# Patient Record
Sex: FEMALE | Race: WHITE | NOT HISPANIC OR LATINO | Employment: FULL TIME | URBAN - METROPOLITAN AREA
[De-identification: names, ages, dates, MRNs, and addresses within clinical notes are randomized per-mention and may not be internally consistent; named-entity substitution may affect disease eponyms.]

---

## 2017-03-20 ENCOUNTER — ALLSCRIPTS OFFICE VISIT (OUTPATIENT)
Dept: OTHER | Facility: OTHER | Age: 57
End: 2017-03-20

## 2018-01-15 VITALS
HEIGHT: 64 IN | HEART RATE: 68 BPM | BODY MASS INDEX: 50.02 KG/M2 | RESPIRATION RATE: 18 BRPM | SYSTOLIC BLOOD PRESSURE: 170 MMHG | DIASTOLIC BLOOD PRESSURE: 71 MMHG | WEIGHT: 293 LBS

## 2019-04-11 ENCOUNTER — TRANSCRIBE ORDERS (OUTPATIENT)
Dept: ADMINISTRATIVE | Facility: HOSPITAL | Age: 59
End: 2019-04-11

## 2019-04-11 DIAGNOSIS — Z12.39 BREAST SCREENING, UNSPECIFIED: Primary | ICD-10-CM

## 2019-06-03 ENCOUNTER — HOSPITAL ENCOUNTER (OUTPATIENT)
Dept: MAMMOGRAPHY | Facility: HOSPITAL | Age: 59
Discharge: HOME | End: 2019-06-03
Attending: SURGERY
Payer: COMMERCIAL

## 2019-06-03 VITALS — HEIGHT: 64 IN | WEIGHT: 293 LBS | BODY MASS INDEX: 50.02 KG/M2

## 2019-06-03 DIAGNOSIS — Z12.39 BREAST SCREENING, UNSPECIFIED: ICD-10-CM

## 2019-06-03 PROCEDURE — 77067 SCR MAMMO BI INCL CAD: CPT

## 2019-06-03 PROCEDURE — 77063 BREAST TOMOSYNTHESIS BI: CPT

## 2019-06-18 ENCOUNTER — HOSPITAL ENCOUNTER (OUTPATIENT)
Dept: MAMMOGRAPHY | Facility: HOSPITAL | Age: 59
Discharge: HOME | End: 2019-06-18
Attending: SURGERY
Payer: COMMERCIAL

## 2019-06-18 DIAGNOSIS — R92.0 MAMMOGRAPHIC MICROCALCIFICATION: ICD-10-CM

## 2019-06-18 PROCEDURE — 77065 DX MAMMO INCL CAD UNI: CPT

## 2019-06-24 ENCOUNTER — OFFICE VISIT (OUTPATIENT)
Dept: SURGERY | Facility: CLINIC | Age: 59
End: 2019-06-24
Payer: COMMERCIAL

## 2019-06-24 VITALS
HEIGHT: 64 IN | RESPIRATION RATE: 16 BRPM | DIASTOLIC BLOOD PRESSURE: 72 MMHG | BODY MASS INDEX: 50.02 KG/M2 | TEMPERATURE: 98.6 F | HEART RATE: 88 BPM | WEIGHT: 293 LBS | SYSTOLIC BLOOD PRESSURE: 140 MMHG

## 2019-06-24 DIAGNOSIS — Z12.39 SCREENING BREAST EXAMINATION: Primary | ICD-10-CM

## 2019-06-24 PROBLEM — N60.92 ATYPICAL DUCTAL HYPERPLASIA OF LEFT BREAST: Status: ACTIVE | Noted: 2019-06-24

## 2019-06-24 PROBLEM — N60.92 ATYPICAL DUCTAL HYPERPLASIA OF LEFT BREAST: Status: RESOLVED | Noted: 2019-06-24 | Resolved: 2019-06-24

## 2019-06-24 PROCEDURE — 99213 OFFICE O/P EST LOW 20 MIN: CPT | Performed by: PHYSICIAN ASSISTANT

## 2019-06-24 RX ORDER — NICOTINE POLACRILEX 4 MG/1
20 GUM, CHEWING ORAL DAILY
COMMUNITY

## 2019-06-24 RX ORDER — LISINOPRIL 10 MG/1
10 TABLET ORAL 2 TIMES DAILY
COMMUNITY
Start: 2018-04-06

## 2019-06-24 RX ORDER — MOMETASONE FUROATE 50 UG/1
2 SPRAY, METERED NASAL AS NEEDED
COMMUNITY
End: 2020-11-16 | Stop reason: SDUPTHER

## 2019-06-24 RX ORDER — ALPRAZOLAM 0.25 MG/1
0.25 TABLET ORAL AS NEEDED
COMMUNITY
Start: 2018-04-07

## 2020-06-08 ENCOUNTER — HOSPITAL ENCOUNTER (OUTPATIENT)
Dept: NON INVASIVE DIAGNOSTICS | Facility: HOSPITAL | Age: 60
Discharge: HOME | End: 2020-06-08
Payer: COMMERCIAL

## 2020-06-08 ENCOUNTER — TRANSCRIBE ORDERS (OUTPATIENT)
Dept: ADMINISTRATIVE | Facility: HOSPITAL | Age: 60
End: 2020-06-08

## 2020-06-08 DIAGNOSIS — M79.662 PAIN OF LEFT CALF: ICD-10-CM

## 2020-06-08 DIAGNOSIS — L53.9 ERYTHEMATOUS CONDITION: ICD-10-CM

## 2020-06-08 DIAGNOSIS — M79.662 PAIN OF LEFT CALF: Primary | ICD-10-CM

## 2020-06-08 PROCEDURE — 93971 EXTREMITY STUDY: CPT

## 2020-06-08 PROCEDURE — 93971 EXTREMITY STUDY: CPT | Performed by: SURGERY

## 2020-06-19 ENCOUNTER — HOSPITAL ENCOUNTER (OUTPATIENT)
Dept: MAMMOGRAPHY | Facility: HOSPITAL | Age: 60
Discharge: HOME | End: 2020-06-19
Payer: COMMERCIAL

## 2020-06-19 VITALS — HEIGHT: 64 IN | WEIGHT: 293 LBS | BODY MASS INDEX: 50.02 KG/M2

## 2020-06-19 DIAGNOSIS — Z12.39 SCREENING BREAST EXAMINATION: ICD-10-CM

## 2020-06-19 PROCEDURE — 77063 BREAST TOMOSYNTHESIS BI: CPT

## 2020-06-19 PROCEDURE — 77067 SCR MAMMO BI INCL CAD: CPT

## 2020-06-25 RX ORDER — HYDROCHLOROTHIAZIDE 25 MG/1
25 TABLET ORAL AS NEEDED
COMMUNITY
Start: 2020-06-08

## 2020-06-29 ENCOUNTER — TELEPHONE (OUTPATIENT)
Dept: SURGERY | Facility: CLINIC | Age: 60
End: 2020-06-29

## 2020-06-29 ENCOUNTER — OFFICE VISIT (OUTPATIENT)
Dept: SURGERY | Facility: CLINIC | Age: 60
End: 2020-06-29
Payer: COMMERCIAL

## 2020-06-29 VITALS
BODY MASS INDEX: 50.02 KG/M2 | HEART RATE: 99 BPM | WEIGHT: 293 LBS | SYSTOLIC BLOOD PRESSURE: 140 MMHG | HEIGHT: 64 IN | DIASTOLIC BLOOD PRESSURE: 72 MMHG | TEMPERATURE: 97.6 F

## 2020-06-29 DIAGNOSIS — Z12.39 SCREENING BREAST EXAMINATION: ICD-10-CM

## 2020-06-29 DIAGNOSIS — N60.92 ATYPICAL DUCTAL HYPERPLASIA OF LEFT BREAST: Primary | ICD-10-CM

## 2020-06-29 PROCEDURE — 99213 OFFICE O/P EST LOW 20 MIN: CPT | Performed by: PHYSICIAN ASSISTANT

## 2021-03-04 ENCOUNTER — TRANSCRIBE ORDERS (OUTPATIENT)
Dept: ADMINISTRATIVE | Facility: HOSPITAL | Age: 61
End: 2021-03-04

## 2021-03-04 DIAGNOSIS — H53.8 BLURRY VISION: ICD-10-CM

## 2021-03-04 DIAGNOSIS — I10 HYPERTENSION, UNSPECIFIED TYPE: ICD-10-CM

## 2021-03-04 DIAGNOSIS — R51.9 NONINTRACTABLE HEADACHE, UNSPECIFIED CHRONICITY PATTERN, UNSPECIFIED HEADACHE TYPE: Primary | ICD-10-CM

## 2021-03-11 ENCOUNTER — HOSPITAL ENCOUNTER (OUTPATIENT)
Dept: MRI IMAGING | Facility: HOSPITAL | Age: 61
Discharge: HOME | End: 2021-03-11
Payer: COMMERCIAL

## 2021-03-11 DIAGNOSIS — R51.9 NONINTRACTABLE HEADACHE, UNSPECIFIED CHRONICITY PATTERN, UNSPECIFIED HEADACHE TYPE: ICD-10-CM

## 2021-03-11 DIAGNOSIS — H53.8 BLURRY VISION: ICD-10-CM

## 2021-03-11 DIAGNOSIS — I10 HYPERTENSION, UNSPECIFIED TYPE: ICD-10-CM

## 2021-03-11 PROCEDURE — G1004 CDSM NDSC: HCPCS

## 2021-03-11 PROCEDURE — 70553 MRI BRAIN STEM W/O & W/DYE: CPT

## 2021-03-11 PROCEDURE — A9585 GADOBUTROL INJECTION: HCPCS | Performed by: RADIOLOGY

## 2021-03-11 RX ADMIN — GADOBUTROL 9 ML: 604.72 INJECTION INTRAVENOUS at 13:17

## 2021-06-21 ENCOUNTER — HOSPITAL ENCOUNTER (OUTPATIENT)
Dept: MAMMOGRAPHY | Facility: HOSPITAL | Age: 61
Discharge: HOME | End: 2021-06-21
Payer: COMMERCIAL

## 2021-06-21 VITALS — BODY MASS INDEX: 50.02 KG/M2 | WEIGHT: 293 LBS | HEIGHT: 64 IN

## 2021-06-21 DIAGNOSIS — N60.92 ATYPICAL DUCTAL HYPERPLASIA OF LEFT BREAST: ICD-10-CM

## 2021-06-21 DIAGNOSIS — Z12.39 SCREENING BREAST EXAMINATION: ICD-10-CM

## 2021-06-21 PROCEDURE — 77067 SCR MAMMO BI INCL CAD: CPT

## 2021-06-21 PROCEDURE — 77063 BREAST TOMOSYNTHESIS BI: CPT

## 2021-06-28 ENCOUNTER — OFFICE VISIT (OUTPATIENT)
Dept: SURGERY | Facility: CLINIC | Age: 61
End: 2021-06-28
Payer: COMMERCIAL

## 2021-06-28 VITALS
BODY MASS INDEX: 50.02 KG/M2 | SYSTOLIC BLOOD PRESSURE: 140 MMHG | HEIGHT: 64 IN | WEIGHT: 293 LBS | DIASTOLIC BLOOD PRESSURE: 88 MMHG | HEART RATE: 97 BPM | RESPIRATION RATE: 18 BRPM | TEMPERATURE: 98.2 F

## 2021-06-28 DIAGNOSIS — N60.92 ATYPICAL DUCTAL HYPERPLASIA OF LEFT BREAST: Primary | ICD-10-CM

## 2021-06-28 PROBLEM — G47.33 OSA (OBSTRUCTIVE SLEEP APNEA): Status: ACTIVE | Noted: 2017-10-17

## 2021-06-28 PROCEDURE — 99213 OFFICE O/P EST LOW 20 MIN: CPT | Performed by: SURGERY

## 2021-06-28 NOTE — ASSESSMENT & PLAN NOTE
The patient returns for her routine yearly breast examination status post breast conserving therapy for the treatment of her left breast atypical ductal hyperplasia by excisional biopsy in June 2019  Today the patient denies all complaints referable to her breasts  On physical examination the patient is a pleasant well-nourished well-developed female  She is in no acute distress  Competent reliable as a historian  The patient has no palpable cervical, supraclavicular, or axillary lymphadenopathy bilaterally  The breasts are symmetric  There are no dominant lumps, masses, skin changes or nipple retraction bilaterally  The patient appears clinically stable with regards to her history of left breast atypical ductal hyperplasia  I have advised yearly physical examination and mammography after 21st June 2022  The patient will follow up with Dr Chuy Aranda her gynecologist for her routine breast health maintenance examinations

## 2021-06-28 NOTE — LETTER
June 28, 2021     Kwabena Moore, [address] [facility] [address]    Patient: Wilmer Flower   YOB: 1960   Date of Visit: 6/28/2021       Dear Dr Gricelda Soriano:    Thank you for referring Shabnam Armenta to me for evaluation  Below are my notes for this consultation  If you have questions, please do not hesitate to call me  I look forward to following your patient along with you  Sincerely,        Obdulia Morales MD        CC: No Recipients  Obdulia Morales MD  6/28/2021  8:58 AM  Incomplete  Assessment/Plan:    Atypical ductal hyperplasia of left breast    The patient returns for her routine yearly breast examination status post breast conserving therapy for the treatment of her left breast atypical ductal hyperplasia by excisional biopsy in June 2019  Today the patient denies all complaints referable to her breasts  On physical examination the patient is a pleasant well-nourished well-developed female  She is in no acute distress  Competent reliable as a historian  The patient has no palpable cervical, supraclavicular, or axillary lymphadenopathy bilaterally  The breasts are symmetric  There are no dominant lumps, masses, skin changes or nipple retraction bilaterally  The patient appears clinically stable with regards to her history of left breast atypical ductal hyperplasia  I have advised yearly physical examination and mammography after 21st June 2022  The patient will follow up with Dr Gabriela Kendall her gynecologist for her routine breast health maintenance examinations  Subjective:      Patient ID: Wilmer Flower is a 59 y o  female  Patient is here today in follow up to her mammogram    Patient stated that she is doing well and denies bilateral breast lumps, nipple discharge or pain  No fever or chills  Mireya Boyd MA        Review of Systems   Constitutional: Negative for chills and fever  HENT: Negative for ear pain and sore throat      Eyes: Negative for pain and visual disturbance  Respiratory: Negative for cough and shortness of breath  Cardiovascular: Negative for chest pain and palpitations  Gastrointestinal: Negative for abdominal pain and vomiting  Genitourinary: Negative for dysuria and hematuria  Musculoskeletal: Negative for arthralgias and back pain  Skin: Negative for color change and rash  Neurological: Negative for seizures and syncope  All other systems reviewed and are negative  Objective:      /88 (BP Location: Left arm, Patient Position: Sitting, Cuff Size: Extra-Large)   Pulse 97   Temp 98 2 °F (36 8 °C) (Temporal)   Resp 18   Ht 5' 4" (1 626 m)   Wt (!) 140 kg (309 lb)   BMI 53 04 kg/m²          Physical Exam  Constitutional:       Appearance: She is well-developed  HENT:      Head: Normocephalic and atraumatic  Eyes:      Conjunctiva/sclera: Conjunctivae normal       Pupils: Pupils are equal, round, and reactive to light  Cardiovascular:      Rate and Rhythm: Normal rate and regular rhythm  Pulmonary:      Effort: Pulmonary effort is normal       Breath sounds: Normal breath sounds  Abdominal:      General: Bowel sounds are normal       Palpations: Abdomen is soft  Musculoskeletal:         General: Normal range of motion  Cervical back: Normal range of motion and neck supple  Skin:     General: Skin is warm and dry  Comments: The patient has no palpable cervical, supraclavicular, or axillary lymphadenopathy bilaterally  The breasts are symmetric  There are no dominant lumps, masses, skin changes or nipple retraction bilaterally  Neurological:      Mental Status: She is alert and oriented to person, place, and time  Psychiatric:         Behavior: Behavior normal          Thought Content:  Thought content normal          Judgment: Judgment normal

## 2021-06-28 NOTE — LETTER
June 28, 2021     Coretta Cons, [address] [facility] [address]    Patient: Artemio Billings   YOB: 1960   Date of Visit: 6/28/2021       Dear Dr Leia Turcios:    Thank you for referring Ramez Shin to me for evaluation  Below are my notes for this consultation  If you have questions, please do not hesitate to call me  I look forward to following your patient along with you  Sincerely,        Alicia Heart MD        CC: No Recipients  Alicia Heart MD  6/28/2021  8:58 AM  Incomplete  Assessment/Plan:    Atypical ductal hyperplasia of left breast    The patient returns for her routine yearly breast examination status post breast conserving therapy for the treatment of her left breast atypical ductal hyperplasia by excisional biopsy in June 2019  Today the patient denies all complaints referable to her breasts  On physical examination the patient is a pleasant well-nourished well-developed female  She is in no acute distress  Competent reliable as a historian  The patient has no palpable cervical, supraclavicular, or axillary lymphadenopathy bilaterally  The breasts are symmetric  There are no dominant lumps, masses, skin changes or nipple retraction bilaterally  The patient appears clinically stable with regards to her history of left breast atypical ductal hyperplasia  I have advised yearly physical examination and mammography after 21st June 2022  The patient will follow up with Dr Soriano Shorten her gynecologist for her routine breast health maintenance examinations  Subjective:      Patient ID: Artemio Billings is a 59 y o  female  Patient is here today in follow up to her mammogram    Patient stated that she is doing well and denies bilateral breast lumps, nipple discharge or pain  No fever or chills  Mireya Boyd MA        Review of Systems   Constitutional: Negative for chills and fever  HENT: Negative for ear pain and sore throat      Eyes: Negative for pain and visual disturbance  Respiratory: Negative for cough and shortness of breath  Cardiovascular: Negative for chest pain and palpitations  Gastrointestinal: Negative for abdominal pain and vomiting  Genitourinary: Negative for dysuria and hematuria  Musculoskeletal: Negative for arthralgias and back pain  Skin: Negative for color change and rash  Neurological: Negative for seizures and syncope  All other systems reviewed and are negative  Objective:      /88 (BP Location: Left arm, Patient Position: Sitting, Cuff Size: Extra-Large)   Pulse 97   Temp 98 2 °F (36 8 °C) (Temporal)   Resp 18   Ht 5' 4" (1 626 m)   Wt (!) 140 kg (309 lb)   BMI 53 04 kg/m²          Physical Exam  Constitutional:       Appearance: She is well-developed  HENT:      Head: Normocephalic and atraumatic  Eyes:      Conjunctiva/sclera: Conjunctivae normal       Pupils: Pupils are equal, round, and reactive to light  Cardiovascular:      Rate and Rhythm: Normal rate and regular rhythm  Pulmonary:      Effort: Pulmonary effort is normal       Breath sounds: Normal breath sounds  Abdominal:      General: Bowel sounds are normal       Palpations: Abdomen is soft  Musculoskeletal:         General: Normal range of motion  Cervical back: Normal range of motion and neck supple  Skin:     General: Skin is warm and dry  Comments: The patient has no palpable cervical, supraclavicular, or axillary lymphadenopathy bilaterally  The breasts are symmetric  There are no dominant lumps, masses, skin changes or nipple retraction bilaterally  Neurological:      Mental Status: She is alert and oriented to person, place, and time  Psychiatric:         Behavior: Behavior normal          Thought Content:  Thought content normal          Judgment: Judgment normal

## 2021-06-28 NOTE — PROGRESS NOTES
Assessment/Plan:    Atypical ductal hyperplasia of left breast    The patient returns for her routine yearly breast examination status post breast conserving therapy for the treatment of her left breast atypical ductal hyperplasia by excisional biopsy in June 2019  Today the patient denies all complaints referable to her breasts  On physical examination the patient is a pleasant well-nourished well-developed female  She is in no acute distress  Competent reliable as a historian  The patient has no palpable cervical, supraclavicular, or axillary lymphadenopathy bilaterally  The breasts are symmetric  There are no dominant lumps, masses, skin changes or nipple retraction bilaterally  The patient appears clinically stable with regards to her history of left breast atypical ductal hyperplasia  I have advised yearly physical examination and mammography after 21st June 2022  The patient will follow up with Dr Courtland Cowden her gynecologist for her routine breast health maintenance examinations  Subjective:      Patient ID: Tracey Proctor is a 59 y o  female  Patient is here today in follow up to her mammogram    Patient stated that she is doing well and denies bilateral breast lumps, nipple discharge or pain  No fever or chills  Mireya Boyd MA        Review of Systems   Constitutional: Negative for chills and fever  HENT: Negative for ear pain and sore throat  Eyes: Negative for pain and visual disturbance  Respiratory: Negative for cough and shortness of breath  Cardiovascular: Negative for chest pain and palpitations  Gastrointestinal: Negative for abdominal pain and vomiting  Genitourinary: Negative for dysuria and hematuria  Musculoskeletal: Negative for arthralgias and back pain  Skin: Negative for color change and rash  Neurological: Negative for seizures and syncope  All other systems reviewed and are negative          Objective:      /88 (BP Location: Left arm, Patient Position: Sitting, Cuff Size: Extra-Large)   Pulse 97   Temp 98 2 °F (36 8 °C) (Temporal)   Resp 18   Ht 5' 4" (1 626 m)   Wt (!) 140 kg (309 lb)   BMI 53 04 kg/m²          Physical Exam  Constitutional:       Appearance: She is well-developed  HENT:      Head: Normocephalic and atraumatic  Eyes:      Conjunctiva/sclera: Conjunctivae normal       Pupils: Pupils are equal, round, and reactive to light  Cardiovascular:      Rate and Rhythm: Normal rate and regular rhythm  Pulmonary:      Effort: Pulmonary effort is normal       Breath sounds: Normal breath sounds  Abdominal:      General: Bowel sounds are normal       Palpations: Abdomen is soft  Musculoskeletal:         General: Normal range of motion  Cervical back: Normal range of motion and neck supple  Skin:     General: Skin is warm and dry  Comments: The patient has no palpable cervical, supraclavicular, or axillary lymphadenopathy bilaterally  The breasts are symmetric  There are no dominant lumps, masses, skin changes or nipple retraction bilaterally  Neurological:      Mental Status: She is alert and oriented to person, place, and time  Psychiatric:         Behavior: Behavior normal          Thought Content:  Thought content normal          Judgment: Judgment normal

## 2021-10-13 ENCOUNTER — TELEPHONE (OUTPATIENT)
Dept: SURGERY | Facility: HOSPITAL | Age: 61
End: 2021-10-13

## 2021-10-14 ENCOUNTER — HOSPITAL ENCOUNTER (OUTPATIENT)
Dept: GASTROENTEROLOGY | Facility: HOSPITAL | Age: 61
Setting detail: OUTPATIENT SURGERY
Discharge: HOME | End: 2021-10-14
Attending: INTERNAL MEDICINE | Admitting: INTERNAL MEDICINE
Payer: COMMERCIAL

## 2021-10-14 ENCOUNTER — ANESTHESIA EVENT (OUTPATIENT)
Dept: GASTROENTEROLOGY | Facility: HOSPITAL | Age: 61
End: 2021-10-14

## 2021-10-14 ENCOUNTER — ANESTHESIA (OUTPATIENT)
Dept: GASTROENTEROLOGY | Facility: HOSPITAL | Age: 61
End: 2021-10-14

## 2021-10-14 VITALS
HEIGHT: 64 IN | HEART RATE: 78 BPM | WEIGHT: 293 LBS | TEMPERATURE: 97.3 F | RESPIRATION RATE: 16 BRPM | BODY MASS INDEX: 50.02 KG/M2 | OXYGEN SATURATION: 98 % | SYSTOLIC BLOOD PRESSURE: 138 MMHG | DIASTOLIC BLOOD PRESSURE: 72 MMHG

## 2021-10-14 DIAGNOSIS — R10.816 EPIGASTRIC ABDOMINAL TENDERNESS: ICD-10-CM

## 2021-10-14 DIAGNOSIS — K21.9 GASTRO-ESOPHAGEAL REFLUX DISEASE WITHOUT ESOPHAGITIS: ICD-10-CM

## 2021-10-14 LAB — GLUCOSE SERPL-MCNC: 178 MG/DL (ref 65–140)

## 2021-10-14 PROCEDURE — 88305 TISSUE EXAM BY PATHOLOGIST: CPT | Performed by: PATHOLOGY

## 2021-10-14 PROCEDURE — 88342 IMHCHEM/IMCYTCHM 1ST ANTB: CPT | Performed by: PATHOLOGY

## 2021-10-14 PROCEDURE — 82948 REAGENT STRIP/BLOOD GLUCOSE: CPT

## 2021-10-14 RX ORDER — LIDOCAINE HYDROCHLORIDE 20 MG/ML
INJECTION, SOLUTION EPIDURAL; INFILTRATION; INTRACAUDAL; PERINEURAL AS NEEDED
Status: DISCONTINUED | OUTPATIENT
Start: 2021-10-14 | End: 2021-10-14

## 2021-10-14 RX ORDER — LIDOCAINE HYDROCHLORIDE 10 MG/ML
0.5 INJECTION, SOLUTION EPIDURAL; INFILTRATION; INTRACAUDAL; PERINEURAL ONCE AS NEEDED
Status: DISCONTINUED | OUTPATIENT
Start: 2021-10-14 | End: 2021-10-18 | Stop reason: HOSPADM

## 2021-10-14 RX ORDER — SODIUM CHLORIDE, SODIUM LACTATE, POTASSIUM CHLORIDE, CALCIUM CHLORIDE 600; 310; 30; 20 MG/100ML; MG/100ML; MG/100ML; MG/100ML
125 INJECTION, SOLUTION INTRAVENOUS CONTINUOUS
Status: DISCONTINUED | OUTPATIENT
Start: 2021-10-14 | End: 2021-10-14

## 2021-10-14 RX ORDER — GLYCOPYRROLATE 0.2 MG/ML
INJECTION INTRAMUSCULAR; INTRAVENOUS AS NEEDED
Status: DISCONTINUED | OUTPATIENT
Start: 2021-10-14 | End: 2021-10-14

## 2021-10-14 RX ORDER — PROPOFOL 10 MG/ML
INJECTION, EMULSION INTRAVENOUS AS NEEDED
Status: DISCONTINUED | OUTPATIENT
Start: 2021-10-14 | End: 2021-10-14

## 2021-10-14 RX ADMIN — PROPOFOL 50 MG: 10 INJECTION, EMULSION INTRAVENOUS at 09:40

## 2021-10-14 RX ADMIN — PROPOFOL 50 MG: 10 INJECTION, EMULSION INTRAVENOUS at 09:44

## 2021-10-14 RX ADMIN — LIDOCAINE HYDROCHLORIDE 100 MG: 20 INJECTION, SOLUTION EPIDURAL; INFILTRATION; INTRACAUDAL; PERINEURAL at 09:38

## 2021-10-14 RX ADMIN — PROPOFOL 50 MG: 10 INJECTION, EMULSION INTRAVENOUS at 09:42

## 2021-10-14 RX ADMIN — PROPOFOL 100 MG: 10 INJECTION, EMULSION INTRAVENOUS at 09:38

## 2021-10-14 RX ADMIN — GLYCOPYRROLATE 0.2 MG: 0.2 INJECTION, SOLUTION INTRAMUSCULAR; INTRAVENOUS at 09:35

## 2021-10-14 RX ADMIN — SODIUM CHLORIDE, SODIUM LACTATE, POTASSIUM CHLORIDE, AND CALCIUM CHLORIDE 125 ML/HR: .6; .31; .03; .02 INJECTION, SOLUTION INTRAVENOUS at 09:09

## 2022-06-22 ENCOUNTER — HOSPITAL ENCOUNTER (OUTPATIENT)
Dept: MAMMOGRAPHY | Facility: HOSPITAL | Age: 62
Discharge: HOME | End: 2022-06-22
Attending: SURGERY
Payer: COMMERCIAL

## 2022-06-22 VITALS — WEIGHT: 293 LBS | HEIGHT: 64 IN | BODY MASS INDEX: 50.02 KG/M2

## 2022-06-22 DIAGNOSIS — N60.92 ATYPICAL DUCTAL HYPERPLASIA OF LEFT BREAST: ICD-10-CM

## 2022-06-22 PROCEDURE — 77067 SCR MAMMO BI INCL CAD: CPT

## 2022-06-22 PROCEDURE — 77063 BREAST TOMOSYNTHESIS BI: CPT

## 2025-03-10 ENCOUNTER — EVALUATION (OUTPATIENT)
Dept: PHYSICAL THERAPY | Facility: CLINIC | Age: 65
End: 2025-03-10
Payer: MEDICARE

## 2025-03-10 DIAGNOSIS — M25.561 CHRONIC PAIN OF RIGHT KNEE: Primary | ICD-10-CM

## 2025-03-10 DIAGNOSIS — M25.562 CHRONIC PAIN OF LEFT KNEE: ICD-10-CM

## 2025-03-10 DIAGNOSIS — G89.29 CHRONIC PAIN OF LEFT KNEE: ICD-10-CM

## 2025-03-10 DIAGNOSIS — G89.29 CHRONIC PAIN OF RIGHT KNEE: Primary | ICD-10-CM

## 2025-03-10 PROCEDURE — 97110 THERAPEUTIC EXERCISES: CPT

## 2025-03-10 PROCEDURE — 97162 PT EVAL MOD COMPLEX 30 MIN: CPT

## 2025-03-10 NOTE — PROGRESS NOTES
"PT Evaluation     Today's date: 3/10/2025  Patient name: Carmen Jones  : 1960  MRN: [id number]  Referring provider: Wayne Sampson*  Dx:   Encounter Diagnosis     ICD-10-CM    1. Chronic pain of right knee  M25.561     G89.29       2. Chronic pain of left knee  M25.562     G89.29           Start Time: 0956  Stop Time: 1048  Total time in clinic (min): 52 minutes    Assessment  Impairments: abnormal gait, abnormal or restricted ROM, activity intolerance, impaired balance, impaired physical strength, lacks appropriate home exercise program, pain with function, weight-bearing intolerance, poor posture , poor body mechanics, unable to perform ADL, participation limitations, activity limitations and endurance  Symptom irritability: moderate    Assessment details: Carmen \"Digna Jones is a 65-year-old female who presented to outpatient physical therapy services for bilateral chronic knee pain. She demonstrated limited B/L knee FLX AROM, and her B/L hips were limited with FLX and IR AROM. Strength deficits were noted to be greater in the R knee. Patellar hypomobility was present in all directions in both knees, and her posterior tibiofemoral mobility was limited. Diffuse TTP was present in the B/L knee joints. Observation of gait showed increased RON, B/L ankle SUP, reduced R hip FLX, and limited L hip EXT. Secondary to these limitations and impairments, Waleska has difficulty performing her usual household and recreational activities. She would benefit from skilled physical therapy services 2 times/week for 6 weeks to address impairments in B/L knee AROM, strength, WB tolerance, gait mechanics, activity tolerance, posture, and stair negotiation. Waleska reviewed and performed the exercises included in her HEP to provide concurrent feedback regarding proper positioning and their purpose within the POC. Time was allotted for questions and concerns, and these were answered to Waleska's satisfaction. Thank you for " your referral.   Understanding of Dx/Px/POC: good     Prognosis: good    Goals  Short-Term Goals (1-4 Weeks)  1. Waleska will improve her B/L hip FLX AROM by 10 degrees.   2. Waleska will increase her B/L knee FLX MMT by 1 grade.   3. Waleska will have minimal TTP on the medial joint line of the L knee.  4. Waleska will report an at worst subjective pain rating of 6/10.     Long-Term Goals (5-8 Weeks)  1. Waleska will demonstrate B/L patellar mobility that is WFL in all directions to indicate reduced quadriceps tightness and greater mobility of the joints.   2. Waleska will demonstrate B/L knee MMT that is WFL to improve her ability to safely negotiate stairs.   3. Waleska will improve her B/L knee symptoms and function by at least 75% to show greater tolerance for household and recreational activities.   4. Waleska will engage in recreational play with her grandchildren with minimal pain in her knees to improve her quality of life.   5. Waleska will achieve a FOTO score of 66 prior to discharge.     Plan  Patient would benefit from: skilled physical therapy    Planned therapy interventions: ADL retraining, abdominal trunk stabilization, balance, ADL training, balance/weight bearing training, body mechanics training, functional ROM exercises, graded activity, gait training, graded exercise, home exercise program, IADL retraining, flexibility, coordination, IASTM, joint mobilization, kinesiology taping, manual therapy, massage, Knight taping, neuromuscular re-education, patient/caregiver education, postural training, self care, strengthening, therapeutic activities, stretching, therapeutic exercise and transfer training    Frequency: 2x week  Duration in weeks: 6  Plan of Care beginning date: 3/10/2025  Plan of Care expiration date: 4/25/2025  Treatment plan discussed with: patient  Plan details: Waleska reviewed denice agreed with the POC.        Subjective Evaluation    History of Present Illness  Mechanism of injury: Waleska reported  that she has experienced knee pain for many years. She has received injections into both knees several times, and she is not a candidate for surgery at this time. Bending her knees after a long period of standing is painful. She avoids stair negotiation due to pain in her knees, and she is very reliant on use of the handrail when necessary. She feels like her legs are getting weaker, especially when ascending a flight of stairs. Her tolerance for walking and standing is at least 30 minutes. Waleska is active with her grandchildren, but she is unable to perform any light running (i.e. chasing a ball) with them secondary to her knees. Through skilled physical therapy services, Waleska would like to ease the pain in her knees, improve her leg strength, and engage in play with her grandchildren.   Quality of life: fair    Patient Goals  Patient goals for therapy: decreased pain, improved balance, increased motion, independence with ADLs/IADLs, return to sport/leisure activities and increased strength    Pain  Current pain ratin  At best pain ratin  At worst pain ratin  Location: Anterior Knee  Quality: sharp and dull ache  Relieving factors: medications and rest (Aleve)  Aggravating factors: stair climbing, standing, walking, sitting and lifting  Progression: no change    Social Support    Employment status: not working  Treatments  Previous treatment: physical therapy and injection treatment  Current treatment: injection treatment, medication and physical therapy        Objective     Palpation   Left   Tenderness of the adductor brevis, adductor longus and rectus femoris.     Right   Tenderness of the adductor brevis, adductor longus and rectus femoris.     Tenderness     Left Hip   Tenderness in the ASIS.   Left Knee   Tenderness in the ITB, lateral joint line, medial joint line, patellar tendon, pes anserinus, quadriceps tendon and tibial tubercle.     Right Knee   Tenderness in the ITB, lateral joint line,  medial joint line, patellar tendon and tibial tubercle.     Active Range of Motion   Left Hip   Flexion: 67 degrees   External rotation (90/90): 37 degrees   Internal rotation (90/90): 25 degrees     Right Hip   Flexion: 70 degrees   External rotation (90/90): 33 degrees   Internal rotation (90/90): 21 degrees   Left Knee   Flexion: 106 degrees   Extension: 0 degrees     Right Knee   Flexion: 106 degrees   Extension: 0 degrees     Mobility   Patellar Mobility:   Left Knee   Hypomobile: left medial, left lateral, left superior and left inferior    Right Knee   Hypomobile: medial, lateral, superior and inferior   Tibiofemoral Mobility:   Left knee   Tibiofemoral tendons within functional limits include the anteriorHypomobile in the posterior tibiofemoral tendon(s).   Right knee Tibiofemoral tendons within functional limits include the anterior. Hypomobile in the posterior tibiofemoral tendon(s).     Patellar Static Positioning   Left Knee: WFL  Right Knee: WFL    Strength/Myotome Testing     Left Hip   Planes of Motion   Flexion: 4  External rotation: 4+  Internal rotation: 4-    Right Hip   Planes of Motion   Flexion: 4  External rotation: 4- (painful!)  Internal rotation: 4-    Left Knee   Flexion: 4  Extension: 4-  Quadriceps contraction: fair    Right Knee   Flexion: 4-  Extension: 4-  Quadriceps contraction: fair    Left Ankle/Foot   Dorsiflexion: 4  Plantar flexion: 5  Inversion: 4  Eversion: 4+    Right Ankle/Foot   Dorsiflexion: 4  Plantar flexion: 4+  Inversion: 4-  Eversion: 4    Ambulation     Quality of Movement During Gait   Trunk  Trunk within functional limits.   Trunk (Left): Positive left lateral lean over stance limb.     Pelvis  Anterior pelvic tilt.     Knee    Knee (Left): Positive quad avoidance and valgus thrust.   Knee (Right): Positive quad avoidance.     Ankle    Ankle (Left): Positive supinated.   Ankle (Right): Positive supinated.     Foot Alignment    Foot Alignment (Left): Positive  planovalgus.   Foot Alignment (Right): Positive planovalgus.     Additional Quality of Movement During Gait Details  Decreased L hip EXT and decreased R hip FLX      Flowsheet Rows      Flowsheet Row Most Recent Value   PT/OT G-Codes    Current Score 65   Projected Score 66               Precautions: Hx of anxiety, T2DM, GERD, and HTN  HEP Code: [id number]   POC: 03/10/2025-04/25/2025      FOTO 03/10        Visit Number 1        Current Score 65        Goal Score 66            Manuals 03/10        PROM of B/L Knees         STM of Quadriceps/Hip Flexor/ITB/Hamstrings         Tibiofemoral Distraction of B/L Knees                  Neuro Re-Ed         Cone Taps (FWD/LAT)         Side Steps w/ Ball Toss         SLS w/ DB Toss                  Ther Ex         Recumbent Bike (AROM/Strength)         Standing Marches         Standing HS Curls HEP        Standing Heel Raises HEP        TKE w/ Ball at Wall         LAQ         Supine SLR HEP        Heel Slides HEP                 Ther Activity          Step-Ups (FWD/LAT)         STS w/ Hi-Lo Table         Squats at Parallel Bars         Sled Push/Pull         DB Lift from Floor to Table                  Gait Training         Stair Negotiation         Crate Carry w/ Ambulation         Gray Negotiation (FWD/LAT)         Farmer's Carry         Resisted FWD/LAT Ambulation                  Modalities

## 2025-03-10 NOTE — LETTER
"2025    Elio Schmid DO  [address] [facility] [address]    Patient: Carmen Jones   YOB: 1960   Date of Visit: 3/10/2025     Encounter Diagnosis     ICD-10-CM    1. Chronic pain of right knee  M25.561     G89.29       2. Chronic pain of left knee  M25.562     G89.29           Dear Dr. Schmid:    Thank you for your recent referral of Carmen Jones. Please review the attached evaluation summary from Carmen's recent visit.     Please verify that you agree with the plan of care by signing the attached order.     If you have any questions or concerns, please do not hesitate to call.     I sincerely appreciate the opportunity to share in the care of one of your patients and hope to have another opportunity to work with you in the near future.       Sincerely,    Romy Torrez, PT      Referring Provider:      I certify that I have read the below Plan of Care and certify the need for these services furnished under this plan of treatment while under my care.                    Elio Schmid DO  [address] [facility] [address]  Via Fax: [phone]          PT Evaluation     Today's date: 3/10/2025  Patient name: Carmen Jones  : 1960  MRN: [id number]  Referring provider: Wayne Sampson*  Dx:   Encounter Diagnosis     ICD-10-CM    1. Chronic pain of right knee  M25.561     G89.29       2. Chronic pain of left knee  M25.562     G89.29           Start Time: 09  Stop Time: 1048  Total time in clinic (min): 52 minutes    Assessment  Impairments: abnormal gait, abnormal or restricted ROM, activity intolerance, impaired balance, impaired physical strength, lacks appropriate home exercise program, pain with function, weight-bearing intolerance, poor posture , poor body mechanics, unable to perform ADL, participation limitations, activity limitations and endurance  Symptom irritability: moderate    Assessment details: Carmen \"Waleska\" Robert is a 65-year-old female who " presented to outpatient physical therapy services for bilateral chronic knee pain. She demonstrated limited B/L knee FLX AROM, and her B/L hips were limited with FLX and IR AROM. Strength deficits were noted to be greater in the R knee. Patellar hypomobility was present in all directions in both knees, and her posterior tibiofemoral mobility was limited. Diffuse TTP was present in the B/L knee joints. Observation of gait showed increased RON, B/L ankle SUP, reduced R hip FLX, and limited L hip EXT. Secondary to these limitations and impairments, Waleska has difficulty performing her usual household and recreational activities. She would benefit from skilled physical therapy services 2 times/week for 6 weeks to address impairments in B/L knee AROM, strength, WB tolerance, gait mechanics, activity tolerance, posture, and stair negotiation. Waleska reviewed and performed the exercises included in her HEP to provide concurrent feedback regarding proper positioning and their purpose within the POC. Time was allotted for questions and concerns, and these were answered to Waleska's satisfaction. Thank you for your referral.   Understanding of Dx/Px/POC: good     Prognosis: good    Goals  Short-Term Goals (1-4 Weeks)  1. Waleska will improve her B/L hip FLX AROM by 10 degrees.   2. Waleska will increase her B/L knee FLX MMT by 1 grade.   3. Waleska will have minimal TTP on the medial joint line of the L knee.  4. Waleska will report an at worst subjective pain rating of 6/10.     Long-Term Goals (5-8 Weeks)  1. Waleska will demonstrate B/L patellar mobility that is WFL in all directions to indicate reduced quadriceps tightness and greater mobility of the joints.   2. Waleska will demonstrate B/L knee MMT that is WFL to improve her ability to safely negotiate stairs.   3. Waleska will improve her B/L knee symptoms and function by at least 75% to show greater tolerance for household and recreational activities.   4. Waleska will engage in  recreational play with her grandchildren with minimal pain in her knees to improve her quality of life.   5. Waleska will achieve a FOTO score of 66 prior to discharge.     Plan  Patient would benefit from: skilled physical therapy    Planned therapy interventions: ADL retraining, abdominal trunk stabilization, balance, ADL training, balance/weight bearing training, body mechanics training, functional ROM exercises, graded activity, gait training, graded exercise, home exercise program, IADL retraining, flexibility, coordination, IASTM, joint mobilization, kinesiology taping, manual therapy, massage, Knight taping, neuromuscular re-education, patient/caregiver education, postural training, self care, strengthening, therapeutic activities, stretching, therapeutic exercise and transfer training    Frequency: 2x week  Duration in weeks: 6  Plan of Care beginning date: 3/10/2025  Plan of Care expiration date: 4/25/2025  Treatment plan discussed with: patient  Plan details: Waleska reviewed denice agreed with the POC.        Subjective Evaluation    History of Present Illness  Mechanism of injury: Waleska reported that she has experienced knee pain for many years. She has received injections into both knees several times, and she is not a candidate for surgery at this time. Bending her knees after a long period of standing is painful. She avoids stair negotiation due to pain in her knees, and she is very reliant on use of the handrail when necessary. She feels like her legs are getting weaker, especially when ascending a flight of stairs. Her tolerance for walking and standing is at least 30 minutes. Waleska is active with her grandchildren, but she is unable to perform any light running (i.e. chasing a ball) with them secondary to her knees. Through skilled physical therapy services, Waleska would like to ease the pain in her knees, improve her leg strength, and engage in play with her grandchildren.   Quality of life:  fair    Patient Goals  Patient goals for therapy: decreased pain, improved balance, increased motion, independence with ADLs/IADLs, return to sport/leisure activities and increased strength    Pain  Current pain ratin  At best pain ratin  At worst pain ratin  Location: Anterior Knee  Quality: sharp and dull ache  Relieving factors: medications and rest (Aleve)  Aggravating factors: stair climbing, standing, walking, sitting and lifting  Progression: no change    Social Support    Employment status: not working  Treatments  Previous treatment: physical therapy and injection treatment  Current treatment: injection treatment, medication and physical therapy        Objective     Palpation   Left   Tenderness of the adductor brevis, adductor longus and rectus femoris.     Right   Tenderness of the adductor brevis, adductor longus and rectus femoris.     Tenderness     Left Hip   Tenderness in the ASIS.   Left Knee   Tenderness in the ITB, lateral joint line, medial joint line, patellar tendon, pes anserinus, quadriceps tendon and tibial tubercle.     Right Knee   Tenderness in the ITB, lateral joint line, medial joint line, patellar tendon and tibial tubercle.     Active Range of Motion   Left Hip   Flexion: 67 degrees   External rotation (90/90): 37 degrees   Internal rotation (90/90): 25 degrees     Right Hip   Flexion: 70 degrees   External rotation (90/90): 33 degrees   Internal rotation (90/90): 21 degrees   Left Knee   Flexion: 106 degrees   Extension: 0 degrees     Right Knee   Flexion: 106 degrees   Extension: 0 degrees     Mobility   Patellar Mobility:   Left Knee   Hypomobile: left medial, left lateral, left superior and left inferior    Right Knee   Hypomobile: medial, lateral, superior and inferior   Tibiofemoral Mobility:   Left knee   Tibiofemoral tendons within functional limits include the anteriorHypomobile in the posterior tibiofemoral tendon(s).   Right knee Tibiofemoral tendons within  functional limits include the anterior. Hypomobile in the posterior tibiofemoral tendon(s).     Patellar Static Positioning   Left Knee: WFL  Right Knee: WFL    Strength/Myotome Testing     Left Hip   Planes of Motion   Flexion: 4  External rotation: 4+  Internal rotation: 4-    Right Hip   Planes of Motion   Flexion: 4  External rotation: 4- (painful!)  Internal rotation: 4-    Left Knee   Flexion: 4  Extension: 4-  Quadriceps contraction: fair    Right Knee   Flexion: 4-  Extension: 4-  Quadriceps contraction: fair    Left Ankle/Foot   Dorsiflexion: 4  Plantar flexion: 5  Inversion: 4  Eversion: 4+    Right Ankle/Foot   Dorsiflexion: 4  Plantar flexion: 4+  Inversion: 4-  Eversion: 4    Ambulation     Quality of Movement During Gait   Trunk  Trunk within functional limits.   Trunk (Left): Positive left lateral lean over stance limb.     Pelvis  Anterior pelvic tilt.     Knee    Knee (Left): Positive quad avoidance and valgus thrust.   Knee (Right): Positive quad avoidance.     Ankle    Ankle (Left): Positive supinated.   Ankle (Right): Positive supinated.     Foot Alignment    Foot Alignment (Left): Positive planovalgus.   Foot Alignment (Right): Positive planovalgus.     Additional Quality of Movement During Gait Details  Decreased L hip EXT and decreased R hip FLX      Flowsheet Rows      Flowsheet Row Most Recent Value   PT/OT G-Codes    Current Score 65   Projected Score 66               Precautions: Hx of anxiety, T2DM, GERD, and HTN  HEP Code: [id number]   POC: 03/10/2025-04/25/2025      FOTO 03/10        Visit Number 1        Current Score 65        Goal Score 66            Manuals 03/10        PROM of B/L Knees         STM of Quadriceps/Hip Flexor/ITB/Hamstrings         Tibiofemoral Distraction of B/L Knees                  Neuro Re-Ed         Cone Taps (FWD/LAT)         Side Steps w/ Ball Toss         SLS w/ DB Toss                  Ther Ex         Recumbent Bike (AROM/Strength)         Standing Marches          Standing HS Curls HEP        Standing Heel Raises HEP        TKE w/ Ball at Wall         LAQ         Supine SLR HEP        Heel Slides HEP                 Ther Activity          Step-Ups (FWD/LAT)         STS w/ Hi-Lo Table         Squats at Parallel Bars         Sled Push/Pull         DB Lift from Floor to Table                  Gait Training         Stair Negotiation         Crate Carry w/ Ambulation         Gray Negotiation (FWD/LAT)         Farmer's Carry         Resisted FWD/LAT Ambulation                  Modalities

## 2025-03-14 ENCOUNTER — OFFICE VISIT (OUTPATIENT)
Dept: PHYSICAL THERAPY | Facility: CLINIC | Age: 65
End: 2025-03-14
Payer: MEDICARE

## 2025-03-14 DIAGNOSIS — M25.562 CHRONIC PAIN OF LEFT KNEE: ICD-10-CM

## 2025-03-14 DIAGNOSIS — G89.29 CHRONIC PAIN OF LEFT KNEE: ICD-10-CM

## 2025-03-14 DIAGNOSIS — M25.561 CHRONIC PAIN OF RIGHT KNEE: Primary | ICD-10-CM

## 2025-03-14 DIAGNOSIS — G89.29 CHRONIC PAIN OF RIGHT KNEE: Primary | ICD-10-CM

## 2025-03-14 PROCEDURE — 97140 MANUAL THERAPY 1/> REGIONS: CPT

## 2025-03-14 PROCEDURE — 97110 THERAPEUTIC EXERCISES: CPT

## 2025-03-14 NOTE — PROGRESS NOTES
"Daily Note     Today's date: 3/14/2025  Patient name: Carmen Jones  : 1960  MRN: [id number]  Referring provider: Wayne Sampson*  Dx:   Encounter Diagnosis     ICD-10-CM    1. Chronic pain of right knee  M25.561     G89.29       2. Chronic pain of left knee  M25.562     G89.29           Start Time: 0900  Stop Time: 0948  Total time in clinic (min): 48 minutes    Subjective: Waleska reported that her right knee felt a \"little achey\" after performing the exercises in her HEP, but it is tolerable discomfort.       Objective: See treatment diary below      Assessment: Waleska tolerated treatment well. Demonstrations and cues were provided throughout this initial treatment visit to ensure appropriate execution of the exercises outlined in her program. She exhibited good recall of exercises included in her HEP, and she had minimal symptoms during treatment. Notable TTP was present in the left distal ITB tendon, and she noted an overall symptom reduction following the visit. She was advised to continue performing her HEP as outlined to maximize the benefit of services. Waleska demonstrated fatigue post treatment and would benefit from continued PT to improve her bilateral knee mobility and stability.       Plan: Continue per plan of care.      Precautions: Hx of anxiety, T2DM, GERD, and HTN  HEP Code: [id number]   POC: 03/10/2025-2025      FOTO 03/10        Visit Number 1        Current Score 65        Goal Score 66            Manuals 03/10 03/14       PROM of B/L Knees  4 min       STM of Quadriceps/Hip Flexor/ITB/Hamstrings  8 min       Tibiofemoral Distraction of B/L Knees                  Neuro Re-Ed         Cone Taps (FWD/LAT)         Side Steps w/ Ball Toss         SLS w/ DB Toss                  Ther Ex         Recumbent Bike (AROM/Strength)  5 min  L1       Standing Marches  20x each       Standing HS Curls HEP 2x10 each       Standing Heel Raises HEP 2x10       TKE w/ Ball at Wall  15x2\"       LAQ  " "15x  1.5 lbs       Supine SLR HEP 15x each       Heel Slides HEP 10x5\" each                Ther Activity          Step-Ups (FWD/LAT)         STS w/ Hi-Lo Table         Squats at Parallel Bars         Sled Push/Pull         DB Lift from Floor to Table                  Gait Training         Stair Negotiation         Crate Carry w/ Ambulation         Gray Negotiation (FWD/LAT)         Farmer's Carry         Resisted FWD/LAT Ambulation                  Modalities                                  "

## 2025-03-17 ENCOUNTER — OFFICE VISIT (OUTPATIENT)
Dept: PHYSICAL THERAPY | Facility: CLINIC | Age: 65
End: 2025-03-17
Payer: MEDICARE

## 2025-03-17 DIAGNOSIS — M25.562 CHRONIC PAIN OF LEFT KNEE: ICD-10-CM

## 2025-03-17 DIAGNOSIS — G89.29 CHRONIC PAIN OF RIGHT KNEE: Primary | ICD-10-CM

## 2025-03-17 DIAGNOSIS — M25.561 CHRONIC PAIN OF RIGHT KNEE: Primary | ICD-10-CM

## 2025-03-17 DIAGNOSIS — G89.29 CHRONIC PAIN OF LEFT KNEE: ICD-10-CM

## 2025-03-17 PROCEDURE — 97110 THERAPEUTIC EXERCISES: CPT

## 2025-03-17 PROCEDURE — 97140 MANUAL THERAPY 1/> REGIONS: CPT

## 2025-03-17 NOTE — PROGRESS NOTES
"Daily Note     Today's date: 3/17/2025  Patient name: Carmen Jones  : 1960  MRN: [id number]  Referring provider: Wayne Sampson*  Dx:   Encounter Diagnosis     ICD-10-CM    1. Chronic pain of right knee  M25.561     G89.29       2. Chronic pain of left knee  M25.562     G89.29           Start Time: 08  Stop Time: 0945  Total time in clinic (min): 53 minutes    Subjective: Waleska reported that her knees are feeling a little achey today, and she attributed this to the poor weather over the weekend.       Objective: See treatment diary below      Assessment: Waleska tolerated treatment well. She demonstrated good recall and execution of established exercises, and she was advised to confirm her tolerance for WB activities. Waleska preferred to utilize the ipsilateral UE during forward step-ups, and she exhibited a valgus thrust with the contralateral LE. Exquisite TTP was present at the distal ITB in the BLE during STM, and this improved with after treatment. Waleska demonstrated fatigue post treatment and would benefit from continued PT to improve her WB tolerance through the BLE.       Plan: Continue per plan of care.      Precautions: Hx of anxiety, T2DM, GERD, and HTN  HEP Code: [id number]   POC: 03/10/2025-2025      FOTO 03/10        Visit Number 1        Current Score 65        Goal Score 66            Manuals 03/10 03/14 03/17      PROM of B/L Knees  4 min       STM of Quadriceps/Hip Flexor/ITB/Hamstrings  8 min 10 min      Tibiofemoral Distraction of B/L Knees                  Neuro Re-Ed         Cone Taps (FWD/LAT)         Side Steps w/ Ball Toss         SLS w/ DB Toss                  Ther Ex         Recumbent Bike (AROM/Strength)  5 min  L1 6 min  L1      Standing Marches  20x each 20x each      Standing HS Curls HEP 2x10 each 2x10 each      Standing Heel Raises HEP 2x10 2x10      TKE w/ Ball at Wall  15x2\" 15x2\"      LAQ  15x  1.5 lbs 2x10  1.5 lbs      Supine SLR HEP 15x each 15x each    " "  Supine Hip ADD Isometric (3\" Hold)   15x      Heel Slides HEP 10x5\" each 10x5\" each               Ther Activity          Step-Ups (FWD/LAT)  10x FWD  6\" 1 UE 10x FWD  6\" 1 UE      STS w/ Hi-Lo Table         Squats at Parallel Bars         Sled Push/Pull         DB Lift from Floor to Table                  Gait Training         Stair Negotiation         Crate Carry w/ Ambulation         Gray Negotiation (FWD/LAT)         Farmer's Carry         Resisted FWD/LAT Ambulation                  Modalities                                    "

## 2025-03-19 ENCOUNTER — OFFICE VISIT (OUTPATIENT)
Dept: PHYSICAL THERAPY | Facility: CLINIC | Age: 65
End: 2025-03-19
Payer: MEDICARE

## 2025-03-19 DIAGNOSIS — M25.561 CHRONIC PAIN OF RIGHT KNEE: Primary | ICD-10-CM

## 2025-03-19 DIAGNOSIS — G89.29 CHRONIC PAIN OF LEFT KNEE: ICD-10-CM

## 2025-03-19 DIAGNOSIS — M25.562 CHRONIC PAIN OF LEFT KNEE: ICD-10-CM

## 2025-03-19 DIAGNOSIS — G89.29 CHRONIC PAIN OF RIGHT KNEE: Primary | ICD-10-CM

## 2025-03-19 PROCEDURE — 97140 MANUAL THERAPY 1/> REGIONS: CPT

## 2025-03-19 PROCEDURE — 97110 THERAPEUTIC EXERCISES: CPT

## 2025-03-19 NOTE — PROGRESS NOTES
"Daily Note     Today's date: 3/19/2025  Patient name: Carmen Jones  : 1960  MRN: [id number]  Referring provider: Wayne Sampson*  Dx:   Encounter Diagnosis     ICD-10-CM    1. Chronic pain of right knee  M25.561     G89.29       2. Chronic pain of left knee  M25.562     G89.29           Start Time: 0858  Stop Time: 0940  Total time in clinic (min): 42 minutes    Subjective: Waleska reported that her knees are feeling \"achey\" today.       Objective: See treatment diary below      Assessment: Waleska tolerated treatment well. Lateral step-ups were well executed with verbal cues to weight-shift to the ascending LE without excess pressure through the support UE. Discomfort was noted during TKE with the left knee, but she declined discontinuing the exercise. IASTM was performed on the quadriceps and adductors within her tolerance, and it was verbally requested that she provide feedback regarding her symptom irritability during and post-treatment. Stretching of the hip adductors showed greater tightness in the RLE. Waleska demonstrated fatigue post treatment and would benefit from continued PT to improve her hip and knee strength to assist with stabilizing the knee joints.       Plan: Continue per plan of care.      Precautions: Hx of anxiety, T2DM, GERD, and HTN  HEP Code: [id number]   POC: 03/10/2025-2025      FOTO 03/10        Visit Number 1        Current Score 65        Goal Score 66            Manuals 03/10 03/14 03/17 03/19     STM of Quadriceps/Hip Flexor/ITB/Hamstrings  8 min 10 min 8 min  IASTM     Tibiofemoral Distraction of B/L Knees         Hip ADD Stretch    5x10\" each              Neuro Re-Ed         Cone Taps (FWD/LAT)         Side Steps w/ Ball Toss         SLS w/ DB Toss                  Ther Ex         Recumbent Bike (AROM/Strength)  5 min  L1 6 min  L1 6 min  L1     Standing Marches  20x each 20x each 20x each  1.5 lbs     Standing HS Curls HEP 2x10 each 2x10 each 2x10   1.5 lbs   " "  Standing Heel Raises HEP 2x10 2x10 2x10     TKE w/ Ball at Wall  15x2\" 15x2\" 15x2\"     LAQ  15x  1.5 lbs 2x10  1.5 lbs 2x10  1.5 lbs     Supine SLR HEP 15x each 15x each 2x10  1.5 lbs     Supine Hip ADD Isometric (3\" Hold)   15x 15x     Heel Slides HEP 10x5\" each 10x5\" each 10x5\" each              Ther Activity          Step-Ups (FWD/LAT)  10x FWD  6\" 1 UE 10x FWD  6\" 1 UE 10x FWD  6\" 1 UE  10x LAT  4\" 1 UE     STS w/ Hi-Lo Table         Squats at Parallel Bars         Sled Push/Pull         DB Lift from Floor to Table                  Gait Training         Stair Negotiation         Crate Carry w/ Ambulation         Gray Negotiation (FWD/LAT)         Farmer's Carry         Resisted FWD/LAT Ambulation                  Modalities                                      "

## 2025-03-25 ENCOUNTER — OFFICE VISIT (OUTPATIENT)
Dept: PHYSICAL THERAPY | Facility: CLINIC | Age: 65
End: 2025-03-25
Payer: MEDICARE

## 2025-03-25 DIAGNOSIS — M25.561 CHRONIC PAIN OF RIGHT KNEE: Primary | ICD-10-CM

## 2025-03-25 DIAGNOSIS — G89.29 CHRONIC PAIN OF LEFT KNEE: ICD-10-CM

## 2025-03-25 DIAGNOSIS — M25.562 CHRONIC PAIN OF LEFT KNEE: ICD-10-CM

## 2025-03-25 DIAGNOSIS — G89.29 CHRONIC PAIN OF RIGHT KNEE: Primary | ICD-10-CM

## 2025-03-25 PROCEDURE — 97140 MANUAL THERAPY 1/> REGIONS: CPT

## 2025-03-25 PROCEDURE — 97110 THERAPEUTIC EXERCISES: CPT

## 2025-03-25 NOTE — PROGRESS NOTES
"Daily Note     Today's date: 3/25/2025  Patient name: Carmen Jones  : 1960  MRN: [id number]  Referring provider: Wayne Sampson*  Dx:   Encounter Diagnosis     ICD-10-CM    1. Chronic pain of right knee  M25.561     G89.29       2. Chronic pain of left knee  M25.562     G89.29           Start Time: 0849  Stop Time: 0940  Total time in clinic (min): 51 minutes    Subjective: Waleska reported that her knees are feeling a little more achey lately.       Objective: See treatment diary below      Assessment: Waleska tolerated treatment well. Her program was performed as outlined, and she had minimal symptom exacerbation during treatment. STS were incorporated to address deficits in CKC strength and activity intolerance, and she demonstrated limited anterior glide of the femur during FLX. Cone taps were utilized to address impairments in knee stability and balance, and she exhibited increased postural sway with the RLE compared to the LLE. IASTM was employed for manual therapy after Waleska stated she preferred it, and she tolerated it well without complaint of discomfort. Waleska would benefit from continued PT to strengthen her bilateral knee joints and maximize her tolerance for ambulation and prolonged static standing.       Plan: Continue per plan of care.      Precautions: Hx of anxiety, T2DM, GERD, and HTN  HEP Code: [id number]   POC: 03/10/2025-2025      FOTO 03/10        Visit Number 1        Current Score 65        Goal Score 66            Manuals 03/10 03/14 03/17 03/19 03/25    STM of Quadriceps/Hip Flexor/ITB/Hamstrings  8 min 10 min 8 min  IASTM 10 min IASTM    Tibiofemoral Distraction of B/L Knees         Hip ADD Stretch    5x10\" each              Neuro Re-Ed         Cone Taps (FWD/LAT)     10x FWD  2 Cones    Side Steps w/ Ball Toss         SLS w/ DB Toss                  Ther Ex         Recumbent Bike (AROM/Strength)  5 min  L1 6 min  L1 6 min  L1 6 min  L1    Standing Marches  20x each 20x " "each 20x each  1.5 lbs 20x each  1.5 lbs    Standing HS Curls HEP 2x10 each 2x10 each 2x10   1.5 lbs 2x10  1.5 lbs    Standing Heel Raises HEP 2x10 2x10 2x10 2x10    TKE w/ Ball at Wall  15x2\" 15x2\" 15x2\" 15x2\"    LAQ  15x  1.5 lbs 2x10  1.5 lbs 2x10  1.5 lbs 2x10  1.5 lbs    Supine SLR HEP 15x each 15x each 2x10  1.5 lbs 2x10  1.5 lbs    Supine Hip ADD Isometric (3\" Hold)   15x 15x 15x    Heel Slides (5\" Hold) HEP 10x each 10x each 10x each 10x each             Ther Activity          Step-Ups (FWD/LAT)  10x FWD  6\" 1 UE 10x FWD  6\" 1 UE 10x each  6\" FWD  4\" LAT 10x each  6\" FWD  4\" LAT    STS w/ Hi-Lo Table     2x5    Squats at Parallel Bars         Sled Push/Pull         DB Lift from Floor to Table                  Gait Training         Stair Negotiation         Crate Carry w/ Ambulation         Gray Negotiation (FWD/LAT)         Farmer's Carry         Resisted FWD/LAT Ambulation                  Modalities                                        "

## 2025-03-28 ENCOUNTER — OFFICE VISIT (OUTPATIENT)
Dept: PHYSICAL THERAPY | Facility: CLINIC | Age: 65
End: 2025-03-28
Payer: MEDICARE

## 2025-03-28 DIAGNOSIS — M25.561 CHRONIC PAIN OF RIGHT KNEE: Primary | ICD-10-CM

## 2025-03-28 DIAGNOSIS — G89.29 CHRONIC PAIN OF RIGHT KNEE: Primary | ICD-10-CM

## 2025-03-28 DIAGNOSIS — G89.29 CHRONIC PAIN OF LEFT KNEE: ICD-10-CM

## 2025-03-28 DIAGNOSIS — M25.562 CHRONIC PAIN OF LEFT KNEE: ICD-10-CM

## 2025-03-28 PROCEDURE — 97140 MANUAL THERAPY 1/> REGIONS: CPT

## 2025-03-28 PROCEDURE — 97110 THERAPEUTIC EXERCISES: CPT

## 2025-03-28 NOTE — PROGRESS NOTES
"Daily Note     Today's date: 3/28/2025  Patient name: Carmen Jones  : 1960  MRN: [id number]  Referring provider: Wayne Sampson*  Dx:   Encounter Diagnosis     ICD-10-CM    1. Chronic pain of right knee  M25.561     G89.29       2. Chronic pain of left knee  M25.562     G89.29           Start Time: 0900  Stop Time: 0954  Total time in clinic (min): 54 minutes    Subjective: Waleska reported that her knees are feeling achey today.       Objective: See treatment diary below      Assessment: Waleska tolerated treatment well. She exhibited mild fatigue following forward step-ups, and she was reliant on her UE to support her during lateral step-ups. Reduced tenderness was noted with IASTM on the hip adductors bilaterally. She continues to demonstrate good tolerance for her program, and she executed her exercises with minimal symptom irritability. Waleska exhibited good technique with therapeutic exercises and would benefit from continued PT to improve her bilateral knee strength and stability to reduce her intolerance for stair negotiation.       Plan: Continue per plan of care.      Precautions: Hx of anxiety, T2DM, GERD, and HTN  HEP Code: [id number]   POC: 03/10/2025-2025      FOTO 03/10        Visit Number 1        Current Score 65        Goal Score 66            Manuals 03/10 03/14 03/17 03/19 03/25 03/28   STM of Quadriceps/Hip Flexor/ITB/Hamstrings  8 min 10 min 8 min  IASTM 10 min IASTM 10 min  IASTM   Tibiofemoral Distraction of B/L Knees         Hip ADD Stretch    5x10\" each              Neuro Re-Ed         Cone Taps (FWD/LAT)     10x FWD  2 Cones 10x FWD  2 Cones   Side Steps w/ Ball Toss         SLS w/ DB Toss                  Ther Ex         Recumbent Bike (AROM/Strength)  5 min  L1 6 min  L1 6 min  L1 6 min  L1 6 min  L1   Standing Marches  20x each 20x each 20x each  1.5 lbs 20x each  1.5 lbs 20x each  1.5 lbs   Standing HS Curls HEP 2x10 each 2x10 each 2x10   1.5 lbs 2x10  1.5 lbs 2x10  1.5 " "lbs   Standing Heel Raises HEP 2x10 2x10 2x10 2x10 2x10   TKE w/ Ball at Wall  15x2\" 15x2\" 15x2\" 15x2\" 15x2\"   LAQ  15x  1.5 lbs 2x10  1.5 lbs 2x10  1.5 lbs 2x10  1.5 lbs 2x10  1.5 lbs   Supine SLR HEP 15x each 15x each 2x10  1.5 lbs 2x10  1.5 lbs 2x10  1.5 lbs   Supine Hip ADD Isometric (3\" Hold)   15x 15x 15x 15x   Heel Slides (5\" Hold) HEP 10x each 10x each 10x each 10x each 15x each            Ther Activity          Step-Ups (FWD/LAT)  10x FWD  6\" 1 UE 10x FWD  6\" 1 UE 10x each  6\" FWD  4\" LAT 10x each  6\" FWD  4\" LAT 15x FWD  6\" Step  10x LAT  4\" Step   STS w/ Hi-Lo Table     2x5 2x5   Squats at Parallel Bars         Sled Push/Pull         DB Lift from Floor to Table                  Gait Training         Stair Negotiation         Crate Carry w/ Ambulation         Gray Negotiation (FWD/LAT)         Farmer's Carry         Resisted FWD/LAT Ambulation                  Modalities                                          "

## 2025-04-01 ENCOUNTER — OFFICE VISIT (OUTPATIENT)
Dept: PHYSICAL THERAPY | Facility: CLINIC | Age: 65
End: 2025-04-01
Payer: MEDICARE

## 2025-04-01 DIAGNOSIS — G89.29 CHRONIC PAIN OF RIGHT KNEE: Primary | ICD-10-CM

## 2025-04-01 DIAGNOSIS — G89.29 CHRONIC PAIN OF LEFT KNEE: ICD-10-CM

## 2025-04-01 DIAGNOSIS — M25.562 CHRONIC PAIN OF LEFT KNEE: ICD-10-CM

## 2025-04-01 DIAGNOSIS — M25.561 CHRONIC PAIN OF RIGHT KNEE: Primary | ICD-10-CM

## 2025-04-01 PROCEDURE — 97530 THERAPEUTIC ACTIVITIES: CPT

## 2025-04-01 PROCEDURE — 97140 MANUAL THERAPY 1/> REGIONS: CPT

## 2025-04-01 PROCEDURE — 97110 THERAPEUTIC EXERCISES: CPT

## 2025-04-01 NOTE — PROGRESS NOTES
"Daily Note     Today's date: 2025  Patient name: Carmen Jones  : 1960  MRN: [id number]  Referring provider: Wayne Sampson*  Dx:   Encounter Diagnosis     ICD-10-CM    1. Chronic pain of right knee  M25.561     G89.29       2. Chronic pain of left knee  M25.562     G89.29           Start Time: 0902  Stop Time: 0948  Total time in clinic (min): 46 minutes    Subjective: Waleska reported that she is sore today after \"tweaking\" her back over the weekend, and she has been walking gingerly since this occurred.       Objective: See treatment diary below      Assessment: Waleska tolerated treatment well. Supine SLRs were held secondary to the pain in her lumbar region. Clamshells were added to her program to address strength deficits in the hip external rotators, and she was advised to incorporate the exercise into her HEP. Waleska would benefit from continued PT to improve her bilateral knee strength and stability to maximize her activity tolerance.        Plan: Continue per plan of care.      Precautions: Hx of anxiety, T2DM, GERD, and HTN  HEP Code: [id number]   POC: 03/10/2025-2025      FOTO 03/10        Visit Number 1        Current Score 65        Goal Score 66            Manuals    STM of Quadriceps/Hip Flexor/ITB/Hamstrings 12 min IASTM  10 min 8 min  IASTM 10 min IASTM 10 min  IASTM   Tibiofemoral Distraction of B/L Knees         Hip ADD Stretch    5x10\" each              Neuro Re-Ed         Cone Taps (FWD/LAT) 15x FWD  2 Cones    10x FWD  2 Cones 10x FWD  2 Cones   Side Steps w/ Ball Toss         SLS w/ DB Toss                  Ther Ex         Recumbent Bike (AROM/Strength) 6 min  L1  6 min  L1 6 min  L1 6 min  L1 6 min  L1   Standing Marches 20x each  1.5 lbs  20x each 20x each  1.5 lbs 20x each  1.5 lbs 20x each  1.5 lbs   Standing HS Curls 2x10  1.5 lbs  2x10 each 2x10   1.5 lbs 2x10  1.5 lbs 2x10  1.5 lbs   Standing Heel Raises   2x10 2x10 2x10 2x10   TKE w/ " "Ball at Wall   (2\" Hold) 15x  15x 15x 15x 15x   LAQ 2x10  1.5 lbs  2x10  1.5 lbs 2x10  1.5 lbs 2x10  1.5 lbs 2x10  1.5 lbs   Supine SLR HOLD  15x each 2x10  1.5 lbs 2x10  1.5 lbs 2x10  1.5 lbs   Supine Hip ADD Isometric (3\" Hold) 15x  15x 15x 15x 15x   Supine Clamshells 2x10  Red        Heel Slides (5\" Hold) 15x each  10x each 10x each 10x each 15x each            Ther Activity          Step-Ups (FWD/LAT) 15x FWD  6\" Step  10x LAT  4\" Step  10x FWD  6\" 1 UE 10x each  6\" FWD  4\" LAT 10x each  6\" FWD  4\" LAT 15x FWD  6\" Step  10x LAT  4\" Step   STS w/ Hi-Lo Table 10x    2x5 2x5   Squats at Parallel Bars         Sled Push/Pull         DB Lift from Floor to Table                  Gait Training         Stair Negotiation         Crate Carry w/ Ambulation         Gray Negotiation (FWD/LAT)         Farmer's Carry         Resisted FWD/LAT Ambulation                  Modalities                                            "

## 2025-04-04 ENCOUNTER — APPOINTMENT (OUTPATIENT)
Dept: PHYSICAL THERAPY | Facility: CLINIC | Age: 65
End: 2025-04-04
Payer: MEDICARE

## 2025-04-08 ENCOUNTER — EVALUATION (OUTPATIENT)
Dept: PHYSICAL THERAPY | Facility: CLINIC | Age: 65
End: 2025-04-08
Payer: MEDICARE

## 2025-04-08 DIAGNOSIS — M25.561 CHRONIC PAIN OF RIGHT KNEE: Primary | ICD-10-CM

## 2025-04-08 DIAGNOSIS — M25.562 CHRONIC PAIN OF LEFT KNEE: ICD-10-CM

## 2025-04-08 DIAGNOSIS — G89.29 CHRONIC PAIN OF RIGHT KNEE: Primary | ICD-10-CM

## 2025-04-08 DIAGNOSIS — G89.29 CHRONIC PAIN OF LEFT KNEE: ICD-10-CM

## 2025-04-08 PROCEDURE — 97110 THERAPEUTIC EXERCISES: CPT

## 2025-04-08 PROCEDURE — 97140 MANUAL THERAPY 1/> REGIONS: CPT

## 2025-04-08 NOTE — PROGRESS NOTES
"PT Evaluation     Today's date: 2025  Patient name: Carmen Jones  : 1960  MRN: [id number]  Referring provider: Wayne Sampson*  Dx:   Encounter Diagnosis     ICD-10-CM    1. Chronic pain of right knee  M25.561     G89.29       2. Chronic pain of left knee  M25.562     G89.29           Start Time: 0850  Stop Time: 0952  Total time in clinic (min): 62 minutes    Assessment  Impairments: abnormal gait, abnormal or restricted ROM, activity intolerance, impaired balance, impaired physical strength, lacks appropriate home exercise program, pain with function, weight-bearing intolerance, poor posture , poor body mechanics, unable to perform ADL, participation limitations, activity limitations and endurance  Symptom irritability: moderate    Assessment details: Carmen \"Digna Jones is a 65-year-old female who presented to outpatient physical therapy services for bilateral chronic knee pain. She attended 8 visits, and she demonstrated good progress toward her functional goals. Her B/L knee AROM remained relatively unchanged since her initial evaluation, and her patellar mobility improved in the medial/lateral directions in both knees. Strength in the hips, knees, and ankles improved to WFL in all directions, but pain was noted in the L hip secondary to her unrelated lumbar pain. Overall TTP decreased in the B/L knees, and it is primarily localized to the medial joint lines. Observation of gait continues to show increased RON, B/L ankle SUP, reduced R hip FLX, and limited L hip EXT. Secondary to these persistent limitations and impairments, Waleska has difficulty performing her usual household and recreational activities, including play with her grandchildren. She would benefit from skilled physical therapy services 2 times/week for 4 weeks to address impairments in B/L knee AROM, strength, WB tolerance, gait mechanics, activity tolerance, posture, and stair negotiation. Waleska reviewed and performed the " exercises included in her progressed HEP to provide concurrent feedback regarding proper positioning and their purpose within the POC. Time was allotted for questions and concerns, and these were answered to Waleska's satisfaction. Thank you for your referral.   Understanding of Dx/Px/POC: good     Prognosis: good    Goals  Short-Term Goals (1-4 Weeks)  1. Waleska will improve her B/L hip FLX AROM by 10 degrees. (Met)  2. Waleska will increase her B/L knee FLX MMT by 1 grade. (Met)  3. Waleska will have minimal TTP on the medial joint line of the L knee. (Ongoing)  4. Waleska will report an at worst subjective pain rating of 6/10. (Met)    Long-Term Goals (5-8 Weeks)  1. Waleska will demonstrate B/L patellar mobility that is WFL in all directions to indicate reduced quadriceps tightness and greater mobility of the joints. (Ongoing)  2. Waleska will demonstrate B/L knee MMT that is WFL to improve her ability to safely negotiate stairs. (Ongoing)  3. Waleska will improve her B/L knee symptoms and function by at least 75% to show greater tolerance for household and recreational activities. (Ongoing-50%)  4. Waleska will engage in recreational play with her grandchildren with minimal pain in her knees to improve her quality of life. (Ongoing-Not Attempted)  5. Waleska will achieve a FOTO score of 66 prior to discharge. (Ongoing)    Plan  Patient would benefit from: skilled physical therapy    Planned therapy interventions: ADL retraining, abdominal trunk stabilization, balance, ADL training, balance/weight bearing training, body mechanics training, functional ROM exercises, graded activity, gait training, graded exercise, home exercise program, IADL retraining, flexibility, coordination, IASTM, joint mobilization, kinesiology taping, manual therapy, massage, Knight taping, neuromuscular re-education, patient/caregiver education, postural training, self care, strengthening, therapeutic activities, stretching, therapeutic exercise and  transfer training    Frequency: 2x week  Duration in weeks: 4  Plan of Care beginning date: 2025  Plan of Care expiration date: 2025  Treatment plan discussed with: patient  Plan details: Waleska reviewed denice agreed with the POC.        Subjective Evaluation    History of Present Illness  Mechanism of injury: Waleska reported that her knees are still aching, but she feels like the strength has improved. Her tolerance for activity is gradually improving, but she has not yet participated in recreation with her grandchildren due to the poor weather. She recently experienced an episode of pain in her low back, and the pain has radiated into her knees on occasion. Her symptoms continue to be bothered with static standing and prolonged ambulation. Waleska continues to avoid stair negotiation due to pain in her knees, and she is very reliant on use of the handrail when necessary. Her tolerance for walking and standing remains at 30 minutes. Waleska is active with her grandchildren, but she is unable to perform any light running (i.e. chasing a ball) with them secondary to her knees. Overall, she feels like her symptoms and function have improved by 50%. Through continued skilled physical therapy services, Waleska would like to ease the pain in her knees, improve her leg strength, and engage in play with her grandchildren.   Quality of life: good    Patient Goals  Patient goals for therapy: decreased pain, improved balance, increased motion, independence with ADLs/IADLs, return to sport/leisure activities and increased strength    Pain  Current pain ratin  At best pain ratin  At worst pain ratin  Location: Anterior Knee  Quality: dull ache  Relieving factors: medications and rest (Aleve)  Aggravating factors: stair climbing, standing, walking and lifting  Progression: improved (strength)    Social Support    Employment status: not working  Treatments  Previous treatment: physical therapy and injection  treatment  Current treatment: injection treatment, medication and physical therapy        Objective     Palpation   Left   No palpable tenderness to the rectus femoris.   Tenderness of the adductor brevis and adductor longus.     Right   No palpable tenderness to the rectus femoris.   Tenderness of the adductor brevis and adductor longus.     Tenderness     Left Hip   Tenderness in the ASIS.   Left Knee   Tenderness in the medial joint line and pes anserinus. No tenderness in the ITB, lateral joint line, patellar tendon, quadriceps tendon and tibial tubercle.     Right Knee   Tenderness in the ITB, lateral joint line, medial joint line and tibial tubercle. No tenderness in the patellar tendon.     Active Range of Motion   Left Hip   Flexion: 88 degrees   External rotation (90/90): 35 degrees   Internal rotation (90/90): 26 degrees     Right Hip   Flexion: 92 degrees   External rotation (90/90): 37 degrees   Internal rotation (90/90): 31 degrees   Left Knee   Flexion: 106 degrees   Extension: 0 degrees     Right Knee   Flexion: 108 degrees   Extension: 0 degrees     Mobility   Patellar Mobility:   Left Knee   WFL: medial and lateral.   Hypomobile: left superior and left inferior    Right Knee   WFL: medial and lateral  Hypomobile: superior and inferior   Tibiofemoral Mobility:   Left knee   Tibiofemoral tendons within functional limits include the anteriorHypomobile in the posterior tibiofemoral tendon(s).   Right knee Tibiofemoral tendons within functional limits include the anterior. Hypomobile in the posterior tibiofemoral tendon(s).     Patellar Static Positioning   Left Knee: WFL  Right Knee: WFL    Strength/Myotome Testing     Left Hip   Planes of Motion   Flexion: 4  External rotation: 4+ (pain in the hip)  Internal rotation: 4    Right Hip   Planes of Motion   Flexion: 4  External rotation: 4+  Internal rotation: 4    Left Knee   Flexion: 4+  Extension: 4+  Quadriceps contraction: fair    Right Knee  "  Flexion: 4+  Extension: 4  Quadriceps contraction: fair    Left Ankle/Foot   Dorsiflexion: 5  Plantar flexion: 5  Inversion: 4+  Eversion: 4+    Right Ankle/Foot   Dorsiflexion: 4+  Plantar flexion: 5  Inversion: 5  Eversion: 5    Ambulation     Quality of Movement During Gait   Trunk  Trunk within functional limits.   Trunk (Left): Positive left lateral lean over stance limb.     Pelvis  Anterior pelvic tilt.     Knee    Knee (Left): Positive quad avoidance and valgus thrust.   Knee (Right): Positive quad avoidance.     Ankle    Ankle (Left): Positive supinated.   Ankle (Right): Positive supinated.     Foot Alignment    Foot Alignment (Left): Positive planovalgus.   Foot Alignment (Right): Positive planovalgus.     Additional Quality of Movement During Gait Details  Decreased L hip EXT and decreased R hip FLX               Precautions: Hx of anxiety, T2DM, GERD, and HTN  HEP Code: [id number]   POC: 04/08/2025-05/09/2025      FOTO 03/10 03/28       Visit Number 1 6       Current Score 65 59       Goal Score 66 66           Manuals 04/01 04/08 03/19 03/25 03/28   Review of B/L Knees  20 min       STM of Quadriceps/Hip Flexor/ITB/Hamstrings 12 min IASTM   8 min  IASTM 10 min IASTM 10 min  IASTM   B/L Patellar Mobilizations  10 min       Tibiofemoral Distraction of B/L Knees         Hip ADD Stretch    5x10\" each              Neuro Re-Ed         Cone Taps (FWD/LAT) 15x FWD  2 Cones    10x FWD  2 Cones 10x FWD  2 Cones   Side Steps w/ Ball Toss         SLS w/ DB Toss                  Ther Ex         Review of HEP/POC/Pt Education  8 min       Recumbent Bike (AROM/Strength) 6 min  L1 6 min  L1  6 min  L1 6 min  L1 6 min  L1   Standing Marches 20x each  1.5 lbs   20x each  1.5 lbs 20x each  1.5 lbs 20x each  1.5 lbs   Standing HS Curls 2x10  1.5 lbs   2x10   1.5 lbs 2x10  1.5 lbs 2x10  1.5 lbs   Standing Heel Raises    2x10 2x10 2x10   TKE w/ Ball at Wall   (2\" Hold) 15x   15x 15x 15x   LAQ 2x10  1.5 lbs 2x10  1.5 lbs  " "2x10  1.5 lbs 2x10  1.5 lbs 2x10  1.5 lbs   Supine SLR HOLD 5x each  2x10  1.5 lbs 2x10  1.5 lbs 2x10  1.5 lbs   Supine Hip ADD Isometric (3\" Hold) 15x 15x  15x 15x 15x   Supine Clamshells 2x10  Red 2x10  Red       Heel Slides (5\" Hold) 15x each 15x each  10x each 10x each 15x each            Ther Activity          Step-Ups (FWD/LAT) 15x FWD  6\" Step  10x LAT  4\" Step   10x each  6\" FWD  4\" LAT 10x each  6\" FWD  4\" LAT 15x FWD  6\" Step  10x LAT  4\" Step   STS w/ Hi-Lo Table 10x    2x5 2x5   Squats at Parallel Bars         Sled Push/Pull         DB Lift from Floor to Table                  Gait Training         Stair Negotiation         Crate Carry w/ Ambulation         Gray Negotiation (FWD/LAT)         Farmer's Carry         Resisted FWD/LAT Ambulation                  Modalities                                      "

## 2025-04-08 NOTE — LETTER
"2025    Wayne Sampson MD  [address] [place] [id number]    Patient: Carmen Jones   YOB: 1960   Date of Visit: 2025     Encounter Diagnosis     ICD-10-CM    1. Chronic pain of right knee  M25.561     G89.29       2. Chronic pain of left knee  M25.562     G89.29           Dear Dr. Wayne Sampson MD:    Thank you for your recent referral of Carmen oJnes. Please review the attached evaluation summary from Carmen's recent visit.     Please verify that you agree with the plan of care by signing the attached order.     If you have any questions or concerns, please do not hesitate to call.     I sincerely appreciate the opportunity to share in the care of one of your patients and hope to have another opportunity to work with you in the near future.       Sincerely,    Romy Torrez, PT      Referring Provider:      I certify that I have read the below Plan of Care and certify the need for these services furnished under this plan of treatment while under my care.                    Wayne Sampson MD  [address] [place] [id number]  Via Fax: [phone]          PT Evaluation     Today's date: 2025  Patient name: Carmen Jones  : 1960  MRN: [id number]  Referring provider: Wayne Sampson*  Dx:   Encounter Diagnosis     ICD-10-CM    1. Chronic pain of right knee  M25.561     G89.29       2. Chronic pain of left knee  M25.562     G89.29           Start Time: 0850  Stop Time: 0952  Total time in clinic (min): 62 minutes    Assessment  Impairments: abnormal gait, abnormal or restricted ROM, activity intolerance, impaired balance, impaired physical strength, lacks appropriate home exercise program, pain with function, weight-bearing intolerance, poor posture , poor body mechanics, unable to perform ADL, participation limitations, activity limitations and endurance  Symptom irritability: moderate    Assessment details: Carmen \"Waleska\" Robert is " a 65-year-old female who presented to outpatient physical therapy services for bilateral chronic knee pain. She attended 8 visits, and she demonstrated good progress toward her functional goals. Her B/L knee AROM remained relatively unchanged since her initial evaluation, and her patellar mobility improved in the medial/lateral directions in both knees. Strength in the hips, knees, and ankles improved to WFL in all directions, but pain was noted in the L hip secondary to her unrelated lumbar pain. Overall TTP decreased in the B/L knees, and it is primarily localized to the medial joint lines. Observation of gait continues to show increased RON, B/L ankle SUP, reduced R hip FLX, and limited L hip EXT. Secondary to these persistent limitations and impairments, Waleska has difficulty performing her usual household and recreational activities, including play with her grandchildren. She would benefit from skilled physical therapy services 2 times/week for 4 weeks to address impairments in B/L knee AROM, strength, WB tolerance, gait mechanics, activity tolerance, posture, and stair negotiation. Waleska reviewed and performed the exercises included in her progressed HEP to provide concurrent feedback regarding proper positioning and their purpose within the POC. Time was allotted for questions and concerns, and these were answered to Waleska's satisfaction. Thank you for your referral.   Understanding of Dx/Px/POC: good     Prognosis: good    Goals  Short-Term Goals (1-4 Weeks)  1. Waleska will improve her B/L hip FLX AROM by 10 degrees. (Met)  2. Waleska will increase her B/L knee FLX MMT by 1 grade. (Met)  3. Waleska will have minimal TTP on the medial joint line of the L knee. (Ongoing)  4. Waleska will report an at worst subjective pain rating of 6/10. (Met)    Long-Term Goals (5-8 Weeks)  1. Waleska will demonstrate B/L patellar mobility that is WFL in all directions to indicate reduced quadriceps tightness and greater mobility of  the joints. (Ongoing)  2. Waleska will demonstrate B/L knee MMT that is WFL to improve her ability to safely negotiate stairs. (Ongoing)  3. Waleska will improve her B/L knee symptoms and function by at least 75% to show greater tolerance for household and recreational activities. (Ongoing-50%)  4. Waleska will engage in recreational play with her grandchildren with minimal pain in her knees to improve her quality of life. (Ongoing-Not Attempted)  5. Waleska will achieve a FOTO score of 66 prior to discharge. (Ongoing)    Plan  Patient would benefit from: skilled physical therapy    Planned therapy interventions: ADL retraining, abdominal trunk stabilization, balance, ADL training, balance/weight bearing training, body mechanics training, functional ROM exercises, graded activity, gait training, graded exercise, home exercise program, IADL retraining, flexibility, coordination, IASTM, joint mobilization, kinesiology taping, manual therapy, massage, Knight taping, neuromuscular re-education, patient/caregiver education, postural training, self care, strengthening, therapeutic activities, stretching, therapeutic exercise and transfer training    Frequency: 2x week  Duration in weeks: 4  Plan of Care beginning date: 4/8/2025  Plan of Care expiration date: 5/9/2025  Treatment plan discussed with: patient  Plan details: Waleska reviewed denice agreed with the POC.        Subjective Evaluation    History of Present Illness  Mechanism of injury: Waleska reported that her knees are still aching, but she feels like the strength has improved. Her tolerance for activity is gradually improving, but she has not yet participated in recreation with her grandchildren due to the poor weather. She recently experienced an episode of pain in her low back, and the pain has radiated into her knees on occasion. Her symptoms continue to be bothered with static standing and prolonged ambulation. Waleska continues to avoid stair negotiation due to pain  in her knees, and she is very reliant on use of the handrail when necessary. Her tolerance for walking and standing remains at 30 minutes. Waleska is active with her grandchildren, but she is unable to perform any light running (i.e. chasing a ball) with them secondary to her knees. Overall, she feels like her symptoms and function have improved by 50%. Through continued skilled physical therapy services, Waleska would like to ease the pain in her knees, improve her leg strength, and engage in play with her grandchildren.   Quality of life: good    Patient Goals  Patient goals for therapy: decreased pain, improved balance, increased motion, independence with ADLs/IADLs, return to sport/leisure activities and increased strength    Pain  Current pain ratin  At best pain ratin  At worst pain ratin  Location: Anterior Knee  Quality: dull ache  Relieving factors: medications and rest (Aleve)  Aggravating factors: stair climbing, standing, walking and lifting  Progression: improved (strength)    Social Support    Employment status: not working  Treatments  Previous treatment: physical therapy and injection treatment  Current treatment: injection treatment, medication and physical therapy        Objective     Palpation   Left   No palpable tenderness to the rectus femoris.   Tenderness of the adductor brevis and adductor longus.     Right   No palpable tenderness to the rectus femoris.   Tenderness of the adductor brevis and adductor longus.     Tenderness     Left Hip   Tenderness in the ASIS.   Left Knee   Tenderness in the medial joint line and pes anserinus. No tenderness in the ITB, lateral joint line, patellar tendon, quadriceps tendon and tibial tubercle.     Right Knee   Tenderness in the ITB, lateral joint line, medial joint line and tibial tubercle. No tenderness in the patellar tendon.     Active Range of Motion   Left Hip   Flexion: 88 degrees   External rotation (90/90): 35 degrees   Internal rotation  (90/90): 26 degrees     Right Hip   Flexion: 92 degrees   External rotation (90/90): 37 degrees   Internal rotation (90/90): 31 degrees   Left Knee   Flexion: 106 degrees   Extension: 0 degrees     Right Knee   Flexion: 108 degrees   Extension: 0 degrees     Mobility   Patellar Mobility:   Left Knee   WFL: medial and lateral.   Hypomobile: left superior and left inferior    Right Knee   WFL: medial and lateral  Hypomobile: superior and inferior   Tibiofemoral Mobility:   Left knee   Tibiofemoral tendons within functional limits include the anteriorHypomobile in the posterior tibiofemoral tendon(s).   Right knee Tibiofemoral tendons within functional limits include the anterior. Hypomobile in the posterior tibiofemoral tendon(s).     Patellar Static Positioning   Left Knee: WFL  Right Knee: WFL    Strength/Myotome Testing     Left Hip   Planes of Motion   Flexion: 4  External rotation: 4+ (pain in the hip)  Internal rotation: 4    Right Hip   Planes of Motion   Flexion: 4  External rotation: 4+  Internal rotation: 4    Left Knee   Flexion: 4+  Extension: 4+  Quadriceps contraction: fair    Right Knee   Flexion: 4+  Extension: 4  Quadriceps contraction: fair    Left Ankle/Foot   Dorsiflexion: 5  Plantar flexion: 5  Inversion: 4+  Eversion: 4+    Right Ankle/Foot   Dorsiflexion: 4+  Plantar flexion: 5  Inversion: 5  Eversion: 5    Ambulation     Quality of Movement During Gait   Trunk  Trunk within functional limits.   Trunk (Left): Positive left lateral lean over stance limb.     Pelvis  Anterior pelvic tilt.     Knee    Knee (Left): Positive quad avoidance and valgus thrust.   Knee (Right): Positive quad avoidance.     Ankle    Ankle (Left): Positive supinated.   Ankle (Right): Positive supinated.     Foot Alignment    Foot Alignment (Left): Positive planovalgus.   Foot Alignment (Right): Positive planovalgus.     Additional Quality of Movement During Gait Details  Decreased L hip EXT and decreased R hip  "FLX               Precautions: Hx of anxiety, T2DM, GERD, and HTN  HEP Code: [id number]   POC: 04/08/2025-05/09/2025      FOTO 03/10 03/28       Visit Number 1 6       Current Score 65 59       Goal Score 66 66           Manuals 04/01 04/08 03/19 03/25 03/28   Review of B/L Knees  20 min       STM of Quadriceps/Hip Flexor/ITB/Hamstrings 12 min IASTM   8 min  IASTM 10 min IASTM 10 min  IASTM   B/L Patellar Mobilizations  10 min       Tibiofemoral Distraction of B/L Knees         Hip ADD Stretch    5x10\" each              Neuro Re-Ed         Cone Taps (FWD/LAT) 15x FWD  2 Cones    10x FWD  2 Cones 10x FWD  2 Cones   Side Steps w/ Ball Toss         SLS w/ DB Toss                  Ther Ex         Review of HEP/POC/Pt Education  8 min       Recumbent Bike (AROM/Strength) 6 min  L1 6 min  L1  6 min  L1 6 min  L1 6 min  L1   Standing Marches 20x each  1.5 lbs   20x each  1.5 lbs 20x each  1.5 lbs 20x each  1.5 lbs   Standing HS Curls 2x10  1.5 lbs   2x10   1.5 lbs 2x10  1.5 lbs 2x10  1.5 lbs   Standing Heel Raises    2x10 2x10 2x10   TKE w/ Ball at Wall   (2\" Hold) 15x   15x 15x 15x   LAQ 2x10  1.5 lbs 2x10  1.5 lbs  2x10  1.5 lbs 2x10  1.5 lbs 2x10  1.5 lbs   Supine SLR HOLD 5x each  2x10  1.5 lbs 2x10  1.5 lbs 2x10  1.5 lbs   Supine Hip ADD Isometric (3\" Hold) 15x 15x  15x 15x 15x   Supine Clamshells 2x10  Red 2x10  Red       Heel Slides (5\" Hold) 15x each 15x each  10x each 10x each 15x each            Ther Activity          Step-Ups (FWD/LAT) 15x FWD  6\" Step  10x LAT  4\" Step   10x each  6\" FWD  4\" LAT 10x each  6\" FWD  4\" LAT 15x FWD  6\" Step  10x LAT  4\" Step   STS w/ Hi-Lo Table 10x    2x5 2x5   Squats at Parallel Bars         Sled Push/Pull         DB Lift from Floor to Table                  Gait Training         Stair Negotiation         Crate Carry w/ Ambulation         Gray Negotiation (FWD/LAT)         Farmer's Carry         Resisted FWD/LAT Ambulation                  Modalities                         "

## 2025-04-11 ENCOUNTER — OFFICE VISIT (OUTPATIENT)
Dept: PHYSICAL THERAPY | Facility: CLINIC | Age: 65
End: 2025-04-11
Payer: MEDICARE

## 2025-04-11 DIAGNOSIS — M25.561 CHRONIC PAIN OF RIGHT KNEE: Primary | ICD-10-CM

## 2025-04-11 DIAGNOSIS — G89.29 CHRONIC PAIN OF RIGHT KNEE: Primary | ICD-10-CM

## 2025-04-11 DIAGNOSIS — G89.29 CHRONIC PAIN OF LEFT KNEE: ICD-10-CM

## 2025-04-11 DIAGNOSIS — M25.562 CHRONIC PAIN OF LEFT KNEE: ICD-10-CM

## 2025-04-11 PROCEDURE — 97110 THERAPEUTIC EXERCISES: CPT

## 2025-04-11 PROCEDURE — 97140 MANUAL THERAPY 1/> REGIONS: CPT

## 2025-04-11 NOTE — PROGRESS NOTES
Daily Note     Today's date: 2025  Patient name: Carmen Jones  : 1960  MRN: [id number]  Referring provider: Wayne Sampson*  Dx:   Encounter Diagnosis     ICD-10-CM    1. Chronic pain of right knee  M25.561     G89.29       2. Chronic pain of left knee  M25.562     G89.29           Start Time: 08  Stop Time: 952  Total time in clinic (min): 57 minutes    Subjective: Waleska reported that her joints are aching all over today, and she is questioning whether it is caused by the medication she is on because of her cancer history.       Objective: See treatment diary below      Assessment: Waleska tolerated treatment well. Patellar mobility improved in all directions in the bilateral knees, especially with superior/inferior gliding in the LLE. CGA was provided during cone taps secondary to imbalance and her reporting a fear of falling. Repetitions were increased for established exercises, and she continues to demonstrate good tolerance and execution with minimal cueing. Waleska would benefit from continued PT to improve her knee strength and patellar mobility.       Plan: Continue per plan of care.      Precautions: Hx of anxiety, T2DM, GERD, and HTN  HEP Code: [id number]   POC: 2025-2025      FOTO 03/10 03/28       Visit Number 1 6       Current Score 65 59       Goal Score 66 66           Manuals    STM of Quadriceps/Hip Flexor/ITB/Hamstrings 12 min IASTM  6 min  10 min IASTM 10 min  IASTM   B/L Patellar Mobilizations  10 min 6 min      Tibiofemoral Distraction of B/L Knees         Hip ADD Stretch                  Neuro Re-Ed         Cone Taps (FWD/LAT) 15x FWD  2 Cones  10x FWD  2 Cones  10x FWD  2 Cones 10x FWD  2 Cones   Side Steps w/ Ball Toss         SLS w/ DB Toss                  Ther Ex         Recumbent Bike (AROM/Strength) 6 min  L1 6 min  L1 NuStep  6 min L1  6 min  L1 6 min  L1   Standing Marches 20x each  1.5 lbs  30x each  1.5 lbs  20x each  1.5  "lbs 20x each  1.5 lbs   Standing HS Curls 2x10  1.5 lbs  3x10  1.5 lbs  2x10  1.5 lbs 2x10  1.5 lbs   Standing Heel Raises   3x10  2x10 2x10   TKE w/ Ball at Wall   (2\" Hold) 15x  15x  15x 15x   LAQ 2x10  1.5 lbs 2x10  1.5 lbs 2x10  1.5 lbs  2x10  1.5 lbs 2x10  1.5 lbs   Supine SLR HOLD 5x each 2x5   2x10  1.5 lbs 2x10  1.5 lbs   Supine Hip ADD Isometric (3\" Hold) 15x 15x 15x  15x 15x   Supine Clamshells 2x10  Red 2x10  Red 2x10  Red      Heel Slides (5\" Hold) 15x each 15x each 15x each  10x each 15x each            Ther Activity          Step-Ups (FWD/LAT) 15x FWD  6\" Step  10x LAT  4\" Step  15x FWD  6\" Step  10x LAT  4\" Step  10x each  6\" FWD  4\" LAT 15x FWD  6\" Step  10x LAT  4\" Step   STS w/ Hi-Lo Table 10x  2x5  2x5 2x5   Squats at Parallel Bars         Sled Push/Pull         DB Lift from Floor to Table                  Gait Training         Stair Negotiation         Crate Carry w/ Ambulation         Gray Negotiation (FWD/LAT)         Farmer's Carry         Resisted FWD/LAT Ambulation                  Modalities                                      "

## 2025-04-15 ENCOUNTER — OFFICE VISIT (OUTPATIENT)
Dept: PHYSICAL THERAPY | Facility: CLINIC | Age: 65
End: 2025-04-15
Payer: MEDICARE

## 2025-04-15 DIAGNOSIS — G89.29 CHRONIC PAIN OF RIGHT KNEE: Primary | ICD-10-CM

## 2025-04-15 DIAGNOSIS — M25.562 CHRONIC PAIN OF LEFT KNEE: ICD-10-CM

## 2025-04-15 DIAGNOSIS — G89.29 CHRONIC PAIN OF LEFT KNEE: ICD-10-CM

## 2025-04-15 DIAGNOSIS — M25.561 CHRONIC PAIN OF RIGHT KNEE: Primary | ICD-10-CM

## 2025-04-15 PROCEDURE — 97140 MANUAL THERAPY 1/> REGIONS: CPT

## 2025-04-15 PROCEDURE — 97110 THERAPEUTIC EXERCISES: CPT

## 2025-04-15 NOTE — PROGRESS NOTES
Daily Note     Today's date: 4/15/2025  Patient name: Carmen Jones  : 1960  MRN: [id number]  Referring provider: Wayne Sampson*  Dx:   Encounter Diagnosis     ICD-10-CM    1. Chronic pain of right knee  M25.561     G89.29       2. Chronic pain of left knee  M25.562     G89.29           Start Time: 0856  Stop Time: 09  Total time in clinic (min): 34 minutes    Subjective: Lucia reported that her back pain really impacted her knees over the weekend, and she had difficulty walking after her previous PT visit.       Objective: See treatment diary below      Assessment: Lucia tolerated treatment fairly. Her program was conducted in non-WB positions to prevent further symptom exacerbation. Gentle STM was performed on the bilateral quadriceps to her benefit, and she noted an overall reduction in symptoms post-treatment. Lucia demonstrated fatigue post treatment and would benefit from continued PT to reduce her symptom irritability through strengthening and stabilizing the bilateral knees.       Plan: Continue per plan of care.      Precautions: Hx of anxiety, T2DM, GERD, and HTN  HEP Code: [id number]   POC: 2025-2025      FOTO 03/10 03/28       Visit Number 1 6       Current Score 65 59       Goal Score 66 66           Manuals 04/01 04/08 04/11 04/15  03/28   STM of Quadriceps/Hip Flexor/ITB/Hamstrings 12 min IASTM  6 min 8 min  10 min  IASTM   B/L Patellar Mobilizations  10 min 6 min      Tibiofemoral Distraction of B/L Knees         Hip ADD Stretch                  Neuro Re-Ed         Cone Taps (FWD/LAT) 15x FWD  2 Cones  10x FWD  2 Cones   10x FWD  2 Cones   Side Steps w/ Ball Toss         SLS w/ DB Toss                  Ther Ex         Recumbent Bike (AROM/Strength) 6 min  L1 6 min  L1 NuStep  6 min L1 NuStep  6 min L1  6 min  L1   Standing Marches 20x each  1.5 lbs  30x each  1.5 lbs   20x each  1.5 lbs   Standing HS Curls 2x10  1.5 lbs  3x10  1.5 lbs   2x10  1.5 lbs   Standing Heel Raises    "3x10   2x10   TKE w/ Ball at Wall   (2\" Hold) 15x  15x   15x   LAQ 2x10  1.5 lbs 2x10  1.5 lbs 2x10  1.5 lbs 2x10  2x10  1.5 lbs   SAQ    2x10     Quad Sets (3\" Hold)    15x     Supine SLR HOLD 5x each 2x5  5x each  2x10  1.5 lbs   Supine Hip ADD Isometric (3\" Hold) 15x 15x 15x 20x  15x   Supine Clamshells 2x10  Red 2x10  Red 2x10  Red 2x10  Red     Heel Slides (5\" Hold) 15x each 15x each 15x each 15x each  15x each            Ther Activity          Step-Ups (FWD/LAT) 15x FWD  6\" Step  10x LAT  4\" Step  15x FWD  6\" Step  10x LAT  4\" Step   15x FWD  6\" Step  10x LAT  4\" Step   STS w/ Hi-Lo Table 10x  2x5   2x5   Squats at Parallel Bars         Sled Push/Pull         DB Lift from Floor to Table                  Gait Training         Stair Negotiation         Crate Carry w/ Ambulation         Gray Negotiation (FWD/LAT)         Farmer's Carry         Resisted FWD/LAT Ambulation                  Modalities                                        "

## 2025-04-18 ENCOUNTER — EVALUATION (OUTPATIENT)
Dept: PHYSICAL THERAPY | Facility: CLINIC | Age: 65
End: 2025-04-18
Attending: FAMILY MEDICINE
Payer: MEDICARE

## 2025-04-18 DIAGNOSIS — M25.562 CHRONIC PAIN OF LEFT KNEE: ICD-10-CM

## 2025-04-18 DIAGNOSIS — G89.29 CHRONIC PAIN OF RIGHT KNEE: Primary | ICD-10-CM

## 2025-04-18 DIAGNOSIS — G89.29 CHRONIC PAIN OF LEFT KNEE: ICD-10-CM

## 2025-04-18 DIAGNOSIS — M25.561 CHRONIC PAIN OF RIGHT KNEE: Primary | ICD-10-CM

## 2025-04-18 DIAGNOSIS — M54.50 CHRONIC BILATERAL LOW BACK PAIN, UNSPECIFIED WHETHER SCIATICA PRESENT: ICD-10-CM

## 2025-04-18 DIAGNOSIS — G89.29 CHRONIC BILATERAL LOW BACK PAIN, UNSPECIFIED WHETHER SCIATICA PRESENT: ICD-10-CM

## 2025-04-18 PROCEDURE — 97110 THERAPEUTIC EXERCISES: CPT

## 2025-04-18 PROCEDURE — 97140 MANUAL THERAPY 1/> REGIONS: CPT

## 2025-04-18 PROCEDURE — 97164 PT RE-EVAL EST PLAN CARE: CPT

## 2025-04-18 NOTE — PROGRESS NOTES
"PT Re-Evaluation     Today's date: 2025  Patient name: Carmen Jones  : 1960  MRN: [id number]  Referring provider: Wayne Sampson*  Dx:   Encounter Diagnosis     ICD-10-CM    1. Chronic pain of right knee  M25.561     G89.29       2. Chronic pain of left knee  M25.562     G89.29       3. Chronic bilateral low back pain, unspecified whether sciatica present  M54.50     G89.29             Start Time: 08  Stop Time: 48  Total time in clinic (min): 50 minutes    Assessment  Impairments: abnormal gait, abnormal or restricted ROM, activity intolerance, impaired balance, impaired physical strength, lacks appropriate home exercise program, pain with function, weight-bearing intolerance, poor posture , poor body mechanics, unable to perform ADL, participation limitations, activity limitations and endurance  Symptom irritability: moderate    Assessment details: Carmen \"Waleska\"Robert is a 65-year-old female who presented to outpatient physical therapy services for bilateral chronic knee pain. She attended 11 visits, and she demonstrated good progress toward her functional goals. Her B/L knee AROM remained relatively unchanged since her initial evaluation, and her patellar mobility improved in the medial/lateral directions in both knees. Strength in the hips, knees, and ankles improved to WFL in all directions, but pain was noted in the L hip secondary to her unrelated lumbar pain. Overall TTP decreased in the B/L knees, and it is primarily localized to the medial joint lines. Observation of gait continues to show increased RON, B/L ankle SUP, reduced R hip FLX, and limited L hip EXT. An additional script for chronic low back pain was provided by her PCP, and she was re-evaluated for this diagnosis. AROM deficits were present in all directions for the lumbar spine, and she had greater impairments with R ROT and L SB. She exhibited a (+) FADIR and cross-SLR on the LLE. TTP was present at the B/L PSIS and " Series down time. see paper chart for assessment,. evaluation and disposition SIJ, and her signs and symptoms are consistent with sacroiliac joint dysfunction. Secondary to these persistent limitations and impairments, Waleska has difficulty performing her usual household and recreational activities, including play with her grandchildren. She would benefit from skilled physical therapy services 2 times/week for 8 weeks to address impairments in B/L knee AROM, lumbar AROM, strength, WB tolerance, gait mechanics, activity tolerance, posture, and stair negotiation. Waleska reviewed and performed the exercises included in her progressed HEP to provide concurrent feedback regarding proper positioning and their purpose within the POC. Time was allotted for questions and concerns, and these were answered to Waleska's satisfaction. Thank you for your referral.   Understanding of Dx/Px/POC: good     Prognosis: good    Goals  Short-Term Goals (1-4 Weeks)  1. Waleska will improve her B/L hip FLX AROM by 10 degrees. (Met)  2. Waleska will increase her B/L knee FLX MMT by 1 grade. (Met)  3. Waleska will have minimal TTP on the medial joint line of the L knee. (Ongoing)  4. Waleska will report an at worst subjective pain rating of 6/10. (Met)  5. Waleska will improve her lumbar FLX AROM by 10 degrees.   6. Waleska will have minimal TTP at the B/L PSIS.     Long-Term Goals (5-8 Weeks)  1. Waleska will demonstrate B/L patellar mobility that is WFL in all directions to indicate reduced quadriceps tightness and greater mobility of the joints. (Ongoing)  2. Walseka will demonstrate B/L knee MMT that is WFL to improve her ability to safely negotiate stairs. (Ongoing)  3. Waleska will improve her B/L knee symptoms and function by at least 75% to show greater tolerance for household and recreational activities. (Ongoing-50%)  4. Waleska will engage in recreational play with her grandchildren with minimal pain in her knees to improve her quality of life. (Ongoing-Not Attempted)  5. Waleska will have minimal to no symptoms in her lumbar spine  to show a return to her PLOF.   6. Waleska will achieve a FOTO score of 66 prior to discharge. (Ongoing)    Plan  Patient would benefit from: skilled physical therapy    Planned therapy interventions: ADL retraining, abdominal trunk stabilization, balance, ADL training, balance/weight bearing training, body mechanics training, functional ROM exercises, graded activity, gait training, graded exercise, home exercise program, IADL retraining, flexibility, coordination, IASTM, joint mobilization, kinesiology taping, manual therapy, massage, Knight taping, neuromuscular re-education, patient/caregiver education, postural training, self care, strengthening, therapeutic activities, stretching, therapeutic exercise and transfer training    Frequency: 2x week  Duration in weeks: 8  Plan of Care beginning date: 4/18/2025  Plan of Care expiration date: 6/13/2025  Treatment plan discussed with: patient  Plan details: Waleska reviewed denice agreed with the POC.        Subjective Evaluation    History of Present Illness  Mechanism of injury: Waleska reported that her knees are still aching, but she feels like the strength has improved. Her tolerance for activity is gradually improving, but she has not yet participated in recreation with her grandchildren due to the poor weather. She recently experienced an episode of pain in her low back, and the pain has radiated into her knees on occasion. Her symptoms continue to be bothered with static standing and prolonged ambulation. Waleska continues to avoid stair negotiation due to pain in her knees, and she is very reliant on use of the handrail when necessary. Her tolerance for walking and standing remains at 30 minutes. Waleska is active with her grandchildren, but she is unable to perform any light running (i.e. chasing a ball) with them secondary to her knees. Overall, she feels like her symptoms and function have improved by 50%.     Her back pain began last week, and she has a known  history of back pain. She remarked that it began after a PT visit when she left and did several errands afterward. That weekend, she was unable to walk or perform her usual activities secondary to her back pain. The pain is generally an ache that improves with Aleve and rest. At this time, she does not report any numbness/tingling in the LEs. Through continued skilled physical therapy services, Waleska would like to ease the pain in her knees, improve her leg strength, stop her back pain, and engage in play with her grandchildren.   Quality of life: good    Patient Goals  Patient goals for therapy: decreased pain, improved balance, increased motion, independence with ADLs/IADLs, return to sport/leisure activities and increased strength    Pain  Current pain ratin (2 (back))  At best pain ratin (0 (back))  At worst pain ratin (5 (back))  Location: B/L Anterior Knee (Right Lumbar)  Quality: dull ache (dull ache)  Relieving factors: medications and rest (Aleve)  Aggravating factors: stair climbing, standing, walking and lifting (standing, walking (back))  Progression: improved    Social Support    Employment status: not working  Treatments  Previous treatment: physical therapy and injection treatment  Current treatment: injection treatment, medication and physical therapy        Objective     Palpation   Left   Tenderness of the adductor brevis, adductor longus and rectus abdominus.     Right   Tenderness of the adductor brevis, adductor longus and rectus abdominus.     Tenderness     Left Hip   Tenderness in the ASIS and PSIS. No tenderness in the greater trochanter.     Right Hip   Tenderness in the ASIS, PSIS and greater trochanter.   Left Knee   Tenderness in the medial joint line and pes anserinus. No tenderness in the ITB, lateral joint line, patellar tendon, quadriceps tendon and tibial tubercle.     Right Knee   Tenderness in the ITB, lateral joint line, medial joint line and tibial tubercle. No  tenderness in the patellar tendon.     Active Range of Motion     Lumbar   Flexion: 50 degrees   Extension: 13 degrees   Left lateral flexion: 17 degrees     Right lateral flexion: 20 degrees   Left rotation:  Restriction level: moderate  Right rotation:  Restriction level: minimal  Left Hip   Flexion: 88 degrees   External rotation (90/90): 35 degrees   Internal rotation (90/90): 26 degrees     Right Hip   Flexion: 92 degrees   External rotation (90/90): 37 degrees   Internal rotation (90/90): 31 degrees   Left Knee   Flexion: 106 degrees   Extension: 0 degrees     Right Knee   Flexion: 108 degrees   Extension: 0 degrees     Mobility   Patellar Mobility:   Left Knee   WFL: medial and lateral.   Hypomobile: left superior and left inferior    Right Knee   WFL: medial and lateral  Hypomobile: superior and inferior   Tibiofemoral Mobility:   Left knee   Tibiofemoral tendons within functional limits include the anteriorHypomobile in the posterior tibiofemoral tendon(s).   Right knee Tibiofemoral tendons within functional limits include the anterior. Hypomobile in the posterior tibiofemoral tendon(s).     Patellar Static Positioning   Left Knee: WFL  Right Knee: WFL    Strength/Myotome Testing     Left Hip   Planes of Motion   Flexion: 4  External rotation: 4+ (pain in the hip)  Internal rotation: 4    Right Hip   Planes of Motion   Flexion: 4  External rotation: 4+  Internal rotation: 4    Left Knee   Flexion: 4+  Extension: 4+  Quadriceps contraction: fair    Right Knee   Flexion: 4+  Extension: 4  Quadriceps contraction: fair    Left Ankle/Foot   Dorsiflexion: 5  Plantar flexion: 5  Inversion: 4+  Eversion: 4+    Right Ankle/Foot   Dorsiflexion: 4+  Plantar flexion: 5  Inversion: 5  Eversion: 5    Tests     Lumbar     Left   Positive crossed SLR.   Negative passive SLR.     Right   Negative crossed SLR and passive SLR.     Left Pelvic Girdle/Sacrum   Negative: active SLR test.     Right Pelvic Girdle/Sacrum  "  Negative: active SLR test.     Left Hip   Positive FADIR.   Negative YULIYA and long sit.     Right Hip   Negative YULIYA, FADIR and long sit.     Ambulation     Quality of Movement During Gait   Trunk  Trunk within functional limits.   Trunk (Left): Positive left lateral lean over stance limb.     Pelvis  Anterior pelvic tilt.     Knee    Knee (Left): Positive quad avoidance and valgus thrust.   Knee (Right): Positive quad avoidance.     Ankle    Ankle (Left): Positive supinated.   Ankle (Right): Positive supinated.     Foot Alignment    Foot Alignment (Left): Positive planovalgus.   Foot Alignment (Right): Positive planovalgus.     Additional Quality of Movement During Gait Details  Decreased L hip EXT and decreased R hip FLX               Precautions: Hx of anxiety, T2DM, GERD, and HTN  HEP Code: [id number]   POC: 04/18/2025-06/13/2025      FOTO 03/10 03/28       Visit Number 1 6       Current Score 65 59       Goal Score 66 66           Manuals 04/01 04/08 04/11 04/15 04/18    STM of Quadriceps/Hip Flexor/ITB/Hamstrings 12 min IASTM  6 min 8 min     B/L Patellar Mobilizations  10 min 6 min      Tibiofemoral Distraction of B/L Knees         Hip ADD Stretch         Addition of Lumbar Script     15 min    STM/MFR of Lumbar/Sacral Paraspinals     15 min             Neuro Re-Ed         Cone Taps (FWD/LAT) 15x FWD  2 Cones  10x FWD  2 Cones      Side Steps w/ Ball Toss         SLS w/ DB Toss                  Ther Ex         Recumbent Bike (AROM/Strength) 6 min  L1 6 min  L1 NuStep  6 min L1 NuStep  6 min L1 NuStep  6 min L1    Standing Marches 20x each  1.5 lbs  30x each  1.5 lbs  2x10    Standing HS Curls 2x10  1.5 lbs  3x10  1.5 lbs  2x10    Standing Heel Raises   3x10  20x    TKE w/ Ball at Wall   (2\" Hold) 15x  15x      LAQ 2x10  1.5 lbs 2x10  1.5 lbs 2x10  1.5 lbs 2x10 2x10    SAQ    2x10     Quad Sets (3\" Hold)    15x     Supine SLR HOLD 5x each 2x5  5x each 5x each    Supine Hip ADD Isometric (3\" Hold) 15x 15x " "15x 20x 15x    Supine Clamshells 2x10  Red 2x10  Red 2x10  Red 2x10  Red 15x    Heel Slides (5\" Hold) 15x each 15x each 15x each 15x each              Ther Activity          Step-Ups (FWD/LAT) 15x FWD  6\" Step  10x LAT  4\" Step  15x FWD  6\" Step  10x LAT  4\" Step      STS w/ Hi-Lo Table 10x  2x5      Squats at Parallel Bars         Sled Push/Pull         DB Lift from Floor to Table                  Gait Training         Stair Negotiation         Crate Carry w/ Ambulation         Gray Negotiation (FWD/LAT)         Farmer's Carry         Resisted FWD/LAT Ambulation                  Modalities                                      "

## 2025-04-18 NOTE — LETTER
2025    Elio Schmid DO  [address] [facility] [address]    Patient: Carmen Jones   YOB: 1960   Date of Visit: 2025     Encounter Diagnosis     ICD-10-CM    1. Chronic pain of right knee  M25.561     G89.29       2. Chronic pain of left knee  M25.562     G89.29       3. Chronic bilateral low back pain, unspecified whether sciatica present  M54.50     G89.29           Dear Dr. Elio Schmid, DO:    Thank you for your recent referral of Carmen Jones. Please review the attached evaluation summary from Carmen's recent visit.     Please verify that you agree with the plan of care by signing the attached order.     If you have any questions or concerns, please do not hesitate to call.     I sincerely appreciate the opportunity to share in the care of one of your patients and hope to have another opportunity to work with you in the near future.       Sincerely,    Romy Torrez, PT      Referring Provider:      I certify that I have read the below Plan of Care and certify the need for these services furnished under this plan of treatment while under my care.                    Elio Schmid DO  [address] [facility] [address]  Via Fax: [phone]          PT Re-Evaluation     Today's date: 2025  Patient name: Carmen Jones  : 1960  MRN: [id number]  Referring provider: Wayne Sampson*  Dx:   Encounter Diagnosis     ICD-10-CM    1. Chronic pain of right knee  M25.561     G89.29       2. Chronic pain of left knee  M25.562     G89.29       3. Chronic bilateral low back pain, unspecified whether sciatica present  M54.50     G89.29             Start Time: 0858  Stop Time: 0948  Total time in clinic (min): 50 minutes    Assessment  Impairments: abnormal gait, abnormal or restricted ROM, activity intolerance, impaired balance, impaired physical strength, lacks appropriate home exercise program, pain with function, weight-bearing intolerance, poor posture  ", poor body mechanics, unable to perform ADL, participation limitations, activity limitations and endurance  Symptom irritability: moderate    Assessment details: Carmen \"Waleska\"Robert is a 65-year-old female who presented to outpatient physical therapy services for bilateral chronic knee pain. She attended 11 visits, and she demonstrated good progress toward her functional goals. Her B/L knee AROM remained relatively unchanged since her initial evaluation, and her patellar mobility improved in the medial/lateral directions in both knees. Strength in the hips, knees, and ankles improved to WFL in all directions, but pain was noted in the L hip secondary to her unrelated lumbar pain. Overall TTP decreased in the B/L knees, and it is primarily localized to the medial joint lines. Observation of gait continues to show increased RON, B/L ankle SUP, reduced R hip FLX, and limited L hip EXT. An additional script for chronic low back pain was provided by her PCP, and she was re-evaluated for this diagnosis. AROM deficits were present in all directions for the lumbar spine, and she had greater impairments with R ROT and L SB. She exhibited a (+) FADIR and cross-SLR on the LLE. TTP was present at the B/L PSIS and SIJ, and her signs and symptoms are consistent with sacroiliac joint dysfunction. Secondary to these persistent limitations and impairments, Waleska has difficulty performing her usual household and recreational activities, including play with her grandchildren. She would benefit from skilled physical therapy services 2 times/week for 8 weeks to address impairments in B/L knee AROM, lumbar AROM, strength, WB tolerance, gait mechanics, activity tolerance, posture, and stair negotiation. Waleska reviewed and performed the exercises included in her progressed HEP to provide concurrent feedback regarding proper positioning and their purpose within the POC. Time was allotted for questions and concerns, and these were answered " to Waleska's satisfaction. Thank you for your referral.   Understanding of Dx/Px/POC: good     Prognosis: good    Goals  Short-Term Goals (1-4 Weeks)  1. Waleska will improve her B/L hip FLX AROM by 10 degrees. (Met)  2. Waleska will increase her B/L knee FLX MMT by 1 grade. (Met)  3. Waleska will have minimal TTP on the medial joint line of the L knee. (Ongoing)  4. Waleska will report an at worst subjective pain rating of 6/10. (Met)  5. Waleska will improve her lumbar FLX AROM by 10 degrees.   6. Waleska will have minimal TTP at the B/L PSIS.     Long-Term Goals (5-8 Weeks)  1. Waleska will demonstrate B/L patellar mobility that is WFL in all directions to indicate reduced quadriceps tightness and greater mobility of the joints. (Ongoing)  2. Waleska will demonstrate B/L knee MMT that is WFL to improve her ability to safely negotiate stairs. (Ongoing)  3. Waleska will improve her B/L knee symptoms and function by at least 75% to show greater tolerance for household and recreational activities. (Ongoing-50%)  4. Waleska will engage in recreational play with her grandchildren with minimal pain in her knees to improve her quality of life. (Ongoing-Not Attempted)  5. Waleska will have minimal to no symptoms in her lumbar spine to show a return to her PLOF.   6. Waleska will achieve a FOTO score of 66 prior to discharge. (Ongoing)    Plan  Patient would benefit from: skilled physical therapy    Planned therapy interventions: ADL retraining, abdominal trunk stabilization, balance, ADL training, balance/weight bearing training, body mechanics training, functional ROM exercises, graded activity, gait training, graded exercise, home exercise program, IADL retraining, flexibility, coordination, IASTM, joint mobilization, kinesiology taping, manual therapy, massage, Knight taping, neuromuscular re-education, patient/caregiver education, postural training, self care, strengthening, therapeutic activities, stretching, therapeutic exercise and  transfer training    Frequency: 2x week  Duration in weeks: 8  Plan of Care beginning date: 4/18/2025  Plan of Care expiration date: 6/13/2025  Treatment plan discussed with: patient  Plan details: Waleska reviewed denice agreed with the POC.        Subjective Evaluation    History of Present Illness  Mechanism of injury: Waleska reported that her knees are still aching, but she feels like the strength has improved. Her tolerance for activity is gradually improving, but she has not yet participated in recreation with her grandchildren due to the poor weather. She recently experienced an episode of pain in her low back, and the pain has radiated into her knees on occasion. Her symptoms continue to be bothered with static standing and prolonged ambulation. Waleska continues to avoid stair negotiation due to pain in her knees, and she is very reliant on use of the handrail when necessary. Her tolerance for walking and standing remains at 30 minutes. Waleska is active with her grandchildren, but she is unable to perform any light running (i.e. chasing a ball) with them secondary to her knees. Overall, she feels like her symptoms and function have improved by 50%.     Her back pain began last week, and she has a known history of back pain. She remarked that it began after a PT visit when she left and did several errands afterward. That weekend, she was unable to walk or perform her usual activities secondary to her back pain. The pain is generally an ache that improves with Aleve and rest. At this time, she does not report any numbness/tingling in the LEs. Through continued skilled physical therapy services, Waleska would like to ease the pain in her knees, improve her leg strength, stop her back pain, and engage in play with her grandchildren.   Quality of life: good    Patient Goals  Patient goals for therapy: decreased pain, improved balance, increased motion, independence with ADLs/IADLs, return to sport/leisure activities and  increased strength    Pain  Current pain ratin (2 (back))  At best pain ratin (0 (back))  At worst pain ratin (5 (back))  Location: B/L Anterior Knee (Right Lumbar)  Quality: dull ache (dull ache)  Relieving factors: medications and rest (Aleve)  Aggravating factors: stair climbing, standing, walking and lifting (standing, walking (back))  Progression: improved    Social Support    Employment status: not working  Treatments  Previous treatment: physical therapy and injection treatment  Current treatment: injection treatment, medication and physical therapy        Objective     Palpation   Left   Tenderness of the adductor brevis, adductor longus and rectus abdominus.     Right   Tenderness of the adductor brevis, adductor longus and rectus abdominus.     Tenderness     Left Hip   Tenderness in the ASIS and PSIS. No tenderness in the greater trochanter.     Right Hip   Tenderness in the ASIS, PSIS and greater trochanter.   Left Knee   Tenderness in the medial joint line and pes anserinus. No tenderness in the ITB, lateral joint line, patellar tendon, quadriceps tendon and tibial tubercle.     Right Knee   Tenderness in the ITB, lateral joint line, medial joint line and tibial tubercle. No tenderness in the patellar tendon.     Active Range of Motion     Lumbar   Flexion: 50 degrees   Extension: 13 degrees   Left lateral flexion: 17 degrees     Right lateral flexion: 20 degrees   Left rotation:  Restriction level: moderate  Right rotation:  Restriction level: minimal  Left Hip   Flexion: 88 degrees   External rotation (90/90): 35 degrees   Internal rotation (90/90): 26 degrees     Right Hip   Flexion: 92 degrees   External rotation (90/90): 37 degrees   Internal rotation (90/90): 31 degrees   Left Knee   Flexion: 106 degrees   Extension: 0 degrees     Right Knee   Flexion: 108 degrees   Extension: 0 degrees     Mobility   Patellar Mobility:   Left Knee   WFL: medial and lateral.   Hypomobile: left superior  and left inferior    Right Knee   WFL: medial and lateral  Hypomobile: superior and inferior   Tibiofemoral Mobility:   Left knee   Tibiofemoral tendons within functional limits include the anteriorHypomobile in the posterior tibiofemoral tendon(s).   Right knee Tibiofemoral tendons within functional limits include the anterior. Hypomobile in the posterior tibiofemoral tendon(s).     Patellar Static Positioning   Left Knee: WFL  Right Knee: WFL    Strength/Myotome Testing     Left Hip   Planes of Motion   Flexion: 4  External rotation: 4+ (pain in the hip)  Internal rotation: 4    Right Hip   Planes of Motion   Flexion: 4  External rotation: 4+  Internal rotation: 4    Left Knee   Flexion: 4+  Extension: 4+  Quadriceps contraction: fair    Right Knee   Flexion: 4+  Extension: 4  Quadriceps contraction: fair    Left Ankle/Foot   Dorsiflexion: 5  Plantar flexion: 5  Inversion: 4+  Eversion: 4+    Right Ankle/Foot   Dorsiflexion: 4+  Plantar flexion: 5  Inversion: 5  Eversion: 5    Tests     Lumbar     Left   Positive crossed SLR.   Negative passive SLR.     Right   Negative crossed SLR and passive SLR.     Left Pelvic Girdle/Sacrum   Negative: active SLR test.     Right Pelvic Girdle/Sacrum   Negative: active SLR test.     Left Hip   Positive FADIR.   Negative YULIYA and long sit.     Right Hip   Negative YULIYA, FADIR and long sit.     Ambulation     Quality of Movement During Gait   Trunk  Trunk within functional limits.   Trunk (Left): Positive left lateral lean over stance limb.     Pelvis  Anterior pelvic tilt.     Knee    Knee (Left): Positive quad avoidance and valgus thrust.   Knee (Right): Positive quad avoidance.     Ankle    Ankle (Left): Positive supinated.   Ankle (Right): Positive supinated.     Foot Alignment    Foot Alignment (Left): Positive planovalgus.   Foot Alignment (Right): Positive planovalgus.     Additional Quality of Movement During Gait Details  Decreased L hip EXT and decreased R hip  "FLX               Precautions: Hx of anxiety, T2DM, GERD, and HTN  HEP Code: [id number]   POC: 04/18/2025-06/13/2025      FOTO 03/10 03/28       Visit Number 1 6       Current Score 65 59       Goal Score 66 66           Manuals 04/01 04/08 04/11 04/15 04/18    STM of Quadriceps/Hip Flexor/ITB/Hamstrings 12 min IASTM  6 min 8 min     B/L Patellar Mobilizations  10 min 6 min      Tibiofemoral Distraction of B/L Knees         Hip ADD Stretch         Addition of Lumbar Script     15 min    STM/MFR of Lumbar/Sacral Paraspinals     15 min             Neuro Re-Ed         Cone Taps (FWD/LAT) 15x FWD  2 Cones  10x FWD  2 Cones      Side Steps w/ Ball Toss         SLS w/ DB Toss                  Ther Ex         Recumbent Bike (AROM/Strength) 6 min  L1 6 min  L1 NuStep  6 min L1 NuStep  6 min L1 NuStep  6 min L1    Standing Marches 20x each  1.5 lbs  30x each  1.5 lbs  2x10    Standing HS Curls 2x10  1.5 lbs  3x10  1.5 lbs  2x10    Standing Heel Raises   3x10  20x    TKE w/ Ball at Wall   (2\" Hold) 15x  15x      LAQ 2x10  1.5 lbs 2x10  1.5 lbs 2x10  1.5 lbs 2x10 2x10    SAQ    2x10     Quad Sets (3\" Hold)    15x     Supine SLR HOLD 5x each 2x5  5x each 5x each    Supine Hip ADD Isometric (3\" Hold) 15x 15x 15x 20x 15x    Supine Clamshells 2x10  Red 2x10  Red 2x10  Red 2x10  Red 15x    Heel Slides (5\" Hold) 15x each 15x each 15x each 15x each              Ther Activity          Step-Ups (FWD/LAT) 15x FWD  6\" Step  10x LAT  4\" Step  15x FWD  6\" Step  10x LAT  4\" Step      STS w/ Hi-Lo Table 10x  2x5      Squats at Parallel Bars         Sled Push/Pull         DB Lift from Floor to Table                  Gait Training         Stair Negotiation         Crate Carry w/ Ambulation         Gray Negotiation (FWD/LAT)         Farmer's Carry         Resisted FWD/LAT Ambulation                  Modalities                                                      "

## 2025-04-22 ENCOUNTER — OFFICE VISIT (OUTPATIENT)
Dept: PHYSICAL THERAPY | Facility: CLINIC | Age: 65
End: 2025-04-22
Payer: MEDICARE

## 2025-04-22 DIAGNOSIS — M25.562 CHRONIC PAIN OF LEFT KNEE: ICD-10-CM

## 2025-04-22 DIAGNOSIS — G89.29 CHRONIC PAIN OF LEFT KNEE: ICD-10-CM

## 2025-04-22 DIAGNOSIS — M54.50 CHRONIC BILATERAL LOW BACK PAIN, UNSPECIFIED WHETHER SCIATICA PRESENT: ICD-10-CM

## 2025-04-22 DIAGNOSIS — G89.29 CHRONIC BILATERAL LOW BACK PAIN, UNSPECIFIED WHETHER SCIATICA PRESENT: ICD-10-CM

## 2025-04-22 DIAGNOSIS — G89.29 CHRONIC PAIN OF RIGHT KNEE: Primary | ICD-10-CM

## 2025-04-22 DIAGNOSIS — M25.561 CHRONIC PAIN OF RIGHT KNEE: Primary | ICD-10-CM

## 2025-04-22 PROCEDURE — 97140 MANUAL THERAPY 1/> REGIONS: CPT

## 2025-04-22 PROCEDURE — 97110 THERAPEUTIC EXERCISES: CPT

## 2025-04-22 NOTE — PROGRESS NOTES
Daily Note     Today's date: 2025  Patient name: Carmen Jones  : 1960  MRN: [id number]  Referring provider: Wayne Sampson*  Dx:   Encounter Diagnosis     ICD-10-CM    1. Chronic pain of right knee  M25.561     G89.29       2. Chronic pain of left knee  M25.562     G89.29       3. Chronic bilateral low back pain, unspecified whether sciatica present  M54.50     G89.29           Start Time: 902  Stop Time: 952  Total time in clinic (min): 50 minutes    Subjective: Waleska reported that her left knee is feeling very sore today, but her back is feeling much better. She is unable to receive an injection into the left knee for another 5 weeks.       Objective: See treatment diary below      Assessment: Waleska tolerated treatment fairly. Ankle weights were withheld for this visit secondary to her symptom irritability in the left knee. Her program remains predominantly in seated and supine positions secondary to her symptoms. Following patient education, tibiofemoral distraction was performed on the left knee. Following treatment, she noted improvement in her lumbar and left knee symptoms, and she was able to ambulate without stiffness in the left knee. Waleska demonstrated fatigue post treatment and would benefit from continued PT to improve her left knee strength and stability and reduce her intolerance for ambulation.       Plan: Continue per plan of care.      Precautions: Hx of anxiety, T2DM, GERD, and HTN  HEP Code: [id number]   POC: 2025-2025      FOTO 03/10 03/28       Visit Number 1 6       Current Score 65 59       Goal Score 66 66           Manuals 04/01 04/08 04/11 04/15 04/18 04/22   STM of Quadriceps/Hip Flexor/ITB/Hamstrings 12 min IASTM  6 min 8 min     B/L Patellar Mobilizations  10 min 6 min      Tibiofemoral Distraction of B/L Knees      4 min   STM/MFR of Lumbar/Sacral Paraspinals     15 min 15 min            Neuro Re-Ed         Cone Taps (FWD/LAT) 15x FWD  2 Cones  10x  "FWD  2 Cones      Side Steps w/ Ball Toss         SLS w/ DB Toss                  Ther Ex         Recumbent Bike (AROM/Strength) 6 min  L1 6 min  L1 NuStep  6 min L1 NuStep  6 min L1 NuStep  6 min L1 NuStep  6 min L1   Standing Marches 20x each  1.5 lbs  30x each  1.5 lbs  2x10 2x10   Standing HS Curls 2x10  1.5 lbs  3x10  1.5 lbs  2x10 2x10   Standing Heel Raises   3x10  20x 20x   TKE w/ Ball at Wall   (2\" Hold) 15x  15x      LAQ 2x10  1.5 lbs 2x10  1.5 lbs 2x10  1.5 lbs 2x10 2x10 3x10   Quad Sets (3\" Hold)    15x  15x   Supine SLR HOLD 5x each 2x5  5x each 5x each 2x5 each   Supine Hip ADD Isometric (3\" Hold) 15x 15x 15x 20x 15x 15x   Supine Clamshells 2x10  Red 2x10  Red 2x10  Red 2x10  Red 15x 20x  Red   Heel Slides (5\" Hold) 15x each 15x each 15x each 15x each  15x each            Ther Activity          Step-Ups (FWD/LAT) 15x FWD  6\" Step  10x LAT  4\" Step  15x FWD  6\" Step  10x LAT  4\" Step   10x FWD  4\" Step   STS w/ Hi-Lo Table 10x  2x5      Squats at Parallel Bars         Sled Push/Pull         DB Lift from Floor to Table                  Gait Training         Stair Negotiation         Crate Carry w/ Ambulation         Gray Negotiation (FWD/LAT)         Farmer's Carry         Resisted FWD/LAT Ambulation                  Modalities                                      "

## 2025-04-25 ENCOUNTER — OFFICE VISIT (OUTPATIENT)
Dept: PHYSICAL THERAPY | Facility: CLINIC | Age: 65
End: 2025-04-25
Payer: MEDICARE

## 2025-04-25 DIAGNOSIS — G89.29 CHRONIC BILATERAL LOW BACK PAIN, UNSPECIFIED WHETHER SCIATICA PRESENT: ICD-10-CM

## 2025-04-25 DIAGNOSIS — M25.561 CHRONIC PAIN OF RIGHT KNEE: Primary | ICD-10-CM

## 2025-04-25 DIAGNOSIS — G89.29 CHRONIC PAIN OF RIGHT KNEE: Primary | ICD-10-CM

## 2025-04-25 DIAGNOSIS — G89.29 CHRONIC PAIN OF LEFT KNEE: ICD-10-CM

## 2025-04-25 DIAGNOSIS — M25.562 CHRONIC PAIN OF LEFT KNEE: ICD-10-CM

## 2025-04-25 DIAGNOSIS — M54.50 CHRONIC BILATERAL LOW BACK PAIN, UNSPECIFIED WHETHER SCIATICA PRESENT: ICD-10-CM

## 2025-04-25 PROCEDURE — 97110 THERAPEUTIC EXERCISES: CPT

## 2025-04-25 PROCEDURE — 97140 MANUAL THERAPY 1/> REGIONS: CPT

## 2025-04-25 NOTE — PROGRESS NOTES
Daily Note     Today's date: 2025  Patient name: Carmen Jones  : 1960  MRN: [id number]  Referring provider: Wayne Sampson*  Dx:   Encounter Diagnosis     ICD-10-CM    1. Chronic pain of right knee  M25.561     G89.29       2. Chronic pain of left knee  M25.562     G89.29       3. Chronic bilateral low back pain, unspecified whether sciatica present  M54.50     G89.29           Start Time: 901  Stop Time: 954  Total time in clinic (min): 53 minutes    Subjective: Waleska reported that her left knee continues to be bothersome, and her low back is feeling better but not back to normal.      Objective: See treatment diary below      Assessment: Waleska tolerated treatment well. Standing hip 3-way was incorporated into her program to address strength deficits in the hips, and she was given demonstrations and verbal cues to ensure appropriate execution. Step-ups were held at her request secondary to pain in the left knee, and this also impeded her ability to execute cone taps. Waleska would benefit from continued PT to improve the stability in her knees and reduce her low back symptom irritability.       Plan: Continue per plan of care.      Precautions: Hx of anxiety, T2DM, GERD, and HTN  HEP Code: [id number]   POC: 2025-2025      FOTO 03/10 03/28       Visit Number 1 6       Current Score 65 59       Goal Score 66 66           Manuals 04/25  04/11 04/15 04/18 04/22   STM of Quadriceps/Hip Flexor/ITB/Hamstrings   6 min 8 min     B/L Patellar Mobilizations   6 min      Tibiofemoral Distraction of B/L Knees 4 min     4 min   STM/MFR of Lumbar/Sacral Paraspinals 9 min    15 min 15 min            Neuro Re-Ed         Cone Taps (FWD/LAT) 5x FWD  2 Cones  10x FWD  2 Cones      Side Steps w/ Ball Toss         SLS w/ DB Toss                  Ther Ex         Recumbent Bike (AROM/Strength) NuStep  6 min L2  NuStep  6 min L1 NuStep  6 min L1 NuStep  6 min L1 NuStep  6 min L1   Standing Hip 3-Way 15x each   "      Standing Marches 20x  2 lbs  30x each  1.5 lbs  2x10 2x10   Standing HS Curls 20x  2 lbs  3x10  1.5 lbs  2x10 2x10   Standing Heel Raises 30x  3x10  20x 20x   TKE w/ Ball at Wall   (2\" Hold) 15x  15x      LAQ 3x10  2x10  1.5 lbs 2x10 2x10 3x10   Supine SLR 2x5  2x5  5x each 5x each 2x5 each   Supine Hip ADD Isometric (3\" Hold) 15x  15x 20x 15x 15x   Supine Clamshells 20x  Red  2x10  Red 2x10  Red 15x 20x  Red   Heel Slides (5\" Hold) 15x                 Ther Activity          Step-Ups (FWD/LAT) HOLD  15x FWD  6\" Step  10x LAT  4\" Step   10x FWD  4\" Step   STS w/ Hi-Lo Table   2x5      Side Steps 6x10ft        Squats at Parallel Bars         Sled Push/Pull         DB Lift from Floor to Table                  Gait Training         Stair Negotiation         Crate Carry w/ Ambulation         Gray Negotiation (FWD/LAT)         Farmer's Carry         Resisted FWD/LAT Ambulation                  Modalities                                        "

## 2025-04-29 ENCOUNTER — OFFICE VISIT (OUTPATIENT)
Dept: PHYSICAL THERAPY | Facility: CLINIC | Age: 65
End: 2025-04-29
Payer: MEDICARE

## 2025-04-29 DIAGNOSIS — M54.50 CHRONIC BILATERAL LOW BACK PAIN, UNSPECIFIED WHETHER SCIATICA PRESENT: ICD-10-CM

## 2025-04-29 DIAGNOSIS — G89.29 CHRONIC BILATERAL LOW BACK PAIN, UNSPECIFIED WHETHER SCIATICA PRESENT: ICD-10-CM

## 2025-04-29 DIAGNOSIS — M25.562 CHRONIC PAIN OF LEFT KNEE: ICD-10-CM

## 2025-04-29 DIAGNOSIS — G89.29 CHRONIC PAIN OF LEFT KNEE: ICD-10-CM

## 2025-04-29 DIAGNOSIS — G89.29 CHRONIC PAIN OF RIGHT KNEE: Primary | ICD-10-CM

## 2025-04-29 DIAGNOSIS — M25.561 CHRONIC PAIN OF RIGHT KNEE: Primary | ICD-10-CM

## 2025-04-29 PROCEDURE — 97140 MANUAL THERAPY 1/> REGIONS: CPT

## 2025-04-29 PROCEDURE — 97110 THERAPEUTIC EXERCISES: CPT

## 2025-04-29 NOTE — PROGRESS NOTES
Daily Note     Today's date: 2025  Patient name: Carmen Jones  : 1960  MRN: [id number]  Referring provider: Wayne Sampson*  Dx:   Encounter Diagnosis     ICD-10-CM    1. Chronic pain of right knee  M25.561     G89.29       2. Chronic pain of left knee  M25.562     G89.29       3. Chronic bilateral low back pain, unspecified whether sciatica present  M54.50     G89.29           Start Time: 902  Stop Time: 956  Total time in clinic (min): 54 minutes    Subjective: Waleska reported that her knees are really bothering her. Her back is feeling a little better but not back to normal.      Objective: See treatment diary below      Assessment: Waleska tolerated treatment fairly. Her left knee was bothersome during standing exercises, especially hamstring curls, and she had difficulty clearing the left foot with symptom exacerbation. The program was modified to best accommodate for her symptom irritability and maximize her time to fatigue. Increased resistance was employed for supine clamshells, and she exhibited good execution and activation of the hip external rotators. Waleska would benefit from continued PT to improve her bilateral knee strength and reduce her low back symptom irritability.       Plan: Continue per plan of care.      Precautions: Hx of anxiety, T2DM, GERD, and HTN  HEP Code: [id number]   POC: 2025-2025      FOTO 03/10 03/28 04/29      Visit Number 1 6 14      Current Score 65 59 67      Goal Score 66 66 66          Manuals 04/25 04/29  04/15 04/18 04/22   STM of Quadriceps/Hip Flexor/ITB/Hamstrings    8 min     B/L Patellar Mobilizations         Tibiofemoral Distraction of B/L Knees 4 min     4 min   STM/MFR of Lumbar/Sacral Paraspinals 9 min 12 min   15 min 15 min            Neuro Re-Ed         Cone Taps (FWD/LAT) 5x FWD  2 Cones HOLD       Side Steps w/ Ball Toss         SLS w/ DB Toss                  Ther Ex         Recumbent Bike (AROM/Strength) NuStep  6 min L2 NuStep  6  "min L2  NuStep  6 min L1 NuStep  6 min L1 NuStep  6 min L1   Standing Hip 3-Way 15x each 15x each       Standing Marches 20x  2 lbs 20x   2 lbs   2x10 2x10   Standing HS Curls 20x  2 lbs 2x10  2 lbs   2x10 2x10   Standing Heel Raises 30x 20x   20x 20x   TKE w/ Ball at Wall   (3\" Hold) 15x 15x       LAQ 3x10 3x10  2x10 2x10 3x10   Supine SLR 2x5   5x each 5x each 2x5 each   Supine Hip ADD Isometric (3\" Hold) 15x 15x3\"  20x 15x 15x   Supine Clamshells 20x  Red 20x  Green  2x10  Red 15x 20x  Red   Heel Slides (5\" Hold) 15x 15x                Ther Activity          Step-Ups (FWD/LAT) HOLD HOLD    10x FWD  4\" Step   STS w/ Hi-Lo Table         Side Steps 6x10ft 6x10ft       Squats at Parallel Bars         Sled Push/Pull         DB Lift from Floor to Table                  Gait Training         Stair Negotiation         Crate Carry w/ Ambulation         Gray Negotiation (FWD/LAT)         Farmer's Carry         Resisted FWD/LAT Ambulation                  Modalities                                          "

## 2025-05-02 ENCOUNTER — APPOINTMENT (OUTPATIENT)
Dept: PHYSICAL THERAPY | Facility: CLINIC | Age: 65
End: 2025-05-02
Payer: MEDICARE

## 2025-05-06 ENCOUNTER — OFFICE VISIT (OUTPATIENT)
Dept: PHYSICAL THERAPY | Facility: CLINIC | Age: 65
End: 2025-05-06
Attending: FAMILY MEDICINE
Payer: MEDICARE

## 2025-05-06 DIAGNOSIS — M54.50 CHRONIC BILATERAL LOW BACK PAIN, UNSPECIFIED WHETHER SCIATICA PRESENT: ICD-10-CM

## 2025-05-06 DIAGNOSIS — G89.29 CHRONIC PAIN OF RIGHT KNEE: Primary | ICD-10-CM

## 2025-05-06 DIAGNOSIS — G89.29 CHRONIC BILATERAL LOW BACK PAIN, UNSPECIFIED WHETHER SCIATICA PRESENT: ICD-10-CM

## 2025-05-06 DIAGNOSIS — M25.561 CHRONIC PAIN OF RIGHT KNEE: Primary | ICD-10-CM

## 2025-05-06 DIAGNOSIS — G89.29 CHRONIC PAIN OF LEFT KNEE: ICD-10-CM

## 2025-05-06 DIAGNOSIS — M25.562 CHRONIC PAIN OF LEFT KNEE: ICD-10-CM

## 2025-05-06 PROCEDURE — 97110 THERAPEUTIC EXERCISES: CPT

## 2025-05-06 PROCEDURE — 97140 MANUAL THERAPY 1/> REGIONS: CPT

## 2025-05-06 NOTE — PROGRESS NOTES
"Daily Note     Today's date: 2025  Patient name: Carmen Jones  : 1960  MRN: [id number]  Referring provider: Wayne Sampson*  Dx:   Encounter Diagnosis     ICD-10-CM    1. Chronic pain of right knee  M25.561     G89.29       2. Chronic pain of left knee  M25.562     G89.29       3. Chronic bilateral low back pain, unspecified whether sciatica present  M54.50     G89.29           Start Time: 902  Stop Time: 950  Total time in clinic (min): 48 minutes    Subjective: Waleska reported that her knees feel stronger and more stable, but she continues to have \"burning\" pain that inhibits her ability to perform her usual activities. Her back continues to improve, but she is not where she would like to be yet.       Objective: See treatment diary below      Assessment: Waleska tolerated treatment fairly. As per her subjective report, knee strengthening was kept to a minimum with non-WB positions. She continues to report tenderness along the lumbar paraspinals and gluteus ivan bilaterally. Waleska would benefit from continued PT to strengthen her B/L knees and reduce her lumbar symptom frequency and severity.       Plan: Continue per plan of care.      Precautions: Hx of anxiety, T2DM, GERD, and HTN  HEP Code: [email or web address]   POC: 2025-2025      FOTO 03/10 03/28 04/29      Visit Number 1 6 14      Current Score 65 59 67      Goal Score 66 66 66          Manuals    STM of Quadriceps/Hip Flexor/ITB/Hamstrings         B/L Patellar Mobilizations         Tibiofemoral Distraction of B/L Knees 4 min     4 min   STM/MFR of Lumbar/Sacral Paraspinals 9 min 12 min 12 min  15 min 15 min            Neuro Re-Ed         Seated Marches w/ ALT Ball Tap   15x      Seated Lumbar ROT w/ WB Hold   15x  Yellow MB      OH Diagonals w/ WB Hold   15x  Red MB      Cone Taps (FWD/LAT) 5x FWD  2 Cones HOLD       Side Steps w/ Ball Toss         SLS w/ DB Toss                  Ther Ex         Recumbent " "Bike (AROM/Strength) NuStep  6 min L2 NuStep  6 min L2 NuStep  6 min L3  NuStep  6 min L1 NuStep  6 min L1   Standing Hip 3-Way 15x each 15x each       Standing Marches 20x  2 lbs 20x   2 lbs   2x10 2x10   Standing HS Curls 20x  2 lbs 2x10  2 lbs   2x10 2x10   Standing Heel Raises 30x 20x   20x 20x   TKE w/ Ball at Wall   (3\" Hold) 15x 15x       LAQ 3x10 3x10 3x10  2x10 3x10   Seated Hip IR w/ Hip ADD Isometric   2x15      Supine SLR 2x5  3x5  5x each 2x5 each   Supine Hip ADD Isometric (3\" Hold) 15x 15x 20x  15x 15x   Supine Clamshells 20x  Red 20x  Green 3x10  Green  15x 20x  Red   Supine Multifidus Set (3\" Hold)   15x      Heel Slides (5\" Hold) 15x 15x       Side-Lying Hip ABD   15x               Ther Activity          Step-Ups (FWD/LAT) HOLD HOLD    10x FWD  4\" Step   STS w/ Hi-Lo Table         Side Steps 6x10ft 6x10ft       Squats at Parallel Bars         Sled Push/Pull         DB Lift from Floor to Table                  Gait Training         Stair Negotiation         Crate Carry w/ Ambulation         Gray Negotiation (FWD/LAT)         Farmer's Carry         Resisted FWD/LAT Ambulation                  Modalities                                            "

## 2025-05-08 ENCOUNTER — OFFICE VISIT (OUTPATIENT)
Dept: PHYSICAL THERAPY | Facility: CLINIC | Age: 65
End: 2025-05-08
Attending: FAMILY MEDICINE
Payer: MEDICARE

## 2025-05-08 DIAGNOSIS — G89.29 CHRONIC PAIN OF LEFT KNEE: ICD-10-CM

## 2025-05-08 DIAGNOSIS — G89.29 CHRONIC BILATERAL LOW BACK PAIN, UNSPECIFIED WHETHER SCIATICA PRESENT: ICD-10-CM

## 2025-05-08 DIAGNOSIS — G89.29 CHRONIC PAIN OF RIGHT KNEE: Primary | ICD-10-CM

## 2025-05-08 DIAGNOSIS — M25.562 CHRONIC PAIN OF LEFT KNEE: ICD-10-CM

## 2025-05-08 DIAGNOSIS — M25.561 CHRONIC PAIN OF RIGHT KNEE: Primary | ICD-10-CM

## 2025-05-08 DIAGNOSIS — M54.50 CHRONIC BILATERAL LOW BACK PAIN, UNSPECIFIED WHETHER SCIATICA PRESENT: ICD-10-CM

## 2025-05-08 PROCEDURE — 97110 THERAPEUTIC EXERCISES: CPT

## 2025-05-08 PROCEDURE — 97140 MANUAL THERAPY 1/> REGIONS: CPT

## 2025-05-08 NOTE — PROGRESS NOTES
Daily Note     Today's date: 2025  Patient name: Carmen Jones  : 1960  MRN: [id number]  Referring provider: Wayne Sampson*  Dx:   Encounter Diagnosis     ICD-10-CM    1. Chronic pain of right knee  M25.561     G89.29       2. Chronic pain of left knee  M25.562     G89.29       3. Chronic bilateral low back pain, unspecified whether sciatica present  M54.50     G89.29           Start Time: 0854  Stop Time: 09  Total time in clinic (min): 46 minutes    Subjective: Waleska reported that her low back is feeling better overall, but her knees continue to have a burning ache.       Objective: See treatment diary below      Assessment: Waleska tolerated treatment fairly. Visual and verbal cues were employed for exercise recall, especially with the additional core strengthening exercises incorporated with her last visit. She reported exquisite tenderness in the right SIJ, and manual therapy was discontinued to prevent further symptom exacerbation. Waleska demonstrated fatigue post treatment and would benefit from continued PT to improve her lumbar mobility and maximize her tolerance for her usual activities.       Plan: Continue per plan of care.      Precautions: Hx of anxiety, T2DM, GERD, and HTN  HEP Code: [email or web address]   POC: 2025-2025      FOTO 03/10 03/28 04/29      Visit Number 1 6 14      Current Score 65 59 67      Goal Score 66 66 66          Manuals    STM of Quadriceps/Hip Flexor/ITB/Hamstrings         B/L Patellar Mobilizations         Tibiofemoral Distraction of B/L Knees 4 min     4 min   STM/MFR of Lumbar/Sacral Paraspinals 9 min 12 min 12 min 12 min  15 min            Neuro Re-Ed         Seated Marches w/ ALT Ball Tap   15x 15x     Seated Lumbar ROT w/ WB Hold   15x  Yellow MB 15x  Yellow MB     OH Diagonals w/ WB Hold   15x  Red MB 15x  Red MB     Cone Taps (FWD/LAT) 5x FWD  2 Cones HOLD       Side Steps w/ Ball Toss         SLS w/ DB Toss                 "  Ther Ex         Recumbent Bike (AROM/Strength) NuStep  6 min L2 NuStep  6 min L2 NuStep  6 min L3 NuStep   6 min L3  NuStep  6 min L1   Standing Hip 3-Way 15x each 15x each       Standing Marches 20x  2 lbs 20x   2 lbs    2x10   Standing HS Curls 20x  2 lbs 2x10  2 lbs    2x10   Standing Heel Raises 30x 20x    20x   TKE w/ Ball at Wall   (3\" Hold) 15x 15x       LAQ 3x10 3x10 3x10 3x10  1.5 lbs  3x10   Seated Hip IR w/ Hip ADD Isometric   2x15 2x15     Supine SLR 2x5  3x5 2x10  2x5 each   Supine Hip ADD Isometric (3\" Hold) 15x 15x 20x 20x  15x   Supine Clamshells 20x  Red 20x  Green 3x10  Green 3x10  Green  20x  Red   Supine Multifidus Set (3\" Hold)   15x 15x     Heel Slides (5\" Hold) 15x 15x       Side-Lying Hip ABD   15x 2x10              Ther Activity          Step-Ups (FWD/LAT) HOLD HOLD    10x FWD  4\" Step   STS w/ Hi-Lo Table         Side Steps 6x10ft 6x10ft       Squats at Parallel Bars         Sled Push/Pull         DB Lift from Floor to Table                  Gait Training         Stair Negotiation         Crate Carry w/ Ambulation         Gray Negotiation (FWD/LAT)         Farmer's Carry         Resisted FWD/LAT Ambulation                  Modalities                                              "

## 2025-05-13 ENCOUNTER — OFFICE VISIT (OUTPATIENT)
Dept: PHYSICAL THERAPY | Facility: CLINIC | Age: 65
End: 2025-05-13
Attending: FAMILY MEDICINE
Payer: MEDICARE

## 2025-05-13 DIAGNOSIS — M54.50 CHRONIC BILATERAL LOW BACK PAIN, UNSPECIFIED WHETHER SCIATICA PRESENT: ICD-10-CM

## 2025-05-13 DIAGNOSIS — M25.561 CHRONIC PAIN OF RIGHT KNEE: Primary | ICD-10-CM

## 2025-05-13 DIAGNOSIS — M25.562 CHRONIC PAIN OF LEFT KNEE: ICD-10-CM

## 2025-05-13 DIAGNOSIS — G89.29 CHRONIC BILATERAL LOW BACK PAIN, UNSPECIFIED WHETHER SCIATICA PRESENT: ICD-10-CM

## 2025-05-13 DIAGNOSIS — G89.29 CHRONIC PAIN OF RIGHT KNEE: Primary | ICD-10-CM

## 2025-05-13 DIAGNOSIS — G89.29 CHRONIC PAIN OF LEFT KNEE: ICD-10-CM

## 2025-05-13 PROCEDURE — 97110 THERAPEUTIC EXERCISES: CPT

## 2025-05-13 PROCEDURE — 97140 MANUAL THERAPY 1/> REGIONS: CPT

## 2025-05-13 NOTE — PROGRESS NOTES
Daily Note     Today's date: 2025  Patient name: Carmen Jones  : 1960  MRN: [id number]  Referring provider: Wayne Sampson*  Dx:   Encounter Diagnosis     ICD-10-CM    1. Chronic pain of right knee  M25.561     G89.29       2. Chronic pain of left knee  M25.562     G89.29       3. Chronic bilateral low back pain, unspecified whether sciatica present  M54.50     G89.29           Start Time: 0900  Stop Time: 0940  Total time in clinic (min): 40 minutes    Subjective: Pt reports continued pain in BL knees.       Objective: See treatment diary below      Assessment: Pt tolerated treatment fairly. Tx was focused on LB this visit due to BL knee pain. No change in symptoms throughout nor post tx. Pt demonstrated fatigue post treatment and would benefit from continued PT to improve her lumbar mobility and maximize her tolerance for her usual activities.       Plan: Continue per plan of care.      Precautions: Hx of anxiety, T2DM, GERD, and HTN  HEP Code: [id number]   POC: 2025-2025      FOTO 03/10 03/28 04/29      Visit Number 1 6 14      Current Score 65 59 67      Goal Score 66 66 66          Manuals    STM of Quadriceps/Hip Flexor/ITB/Hamstrings         B/L Patellar Mobilizations         Tibiofemoral Distraction of B/L Knees 4 min     4 min   STM/MFR of Lumbar/Sacral Paraspinals 9 min 12 min 12 min 12 min 12 min 15 min            Neuro Re-Ed         Seated Marches w/ ALT Ball Tap   15x 15x 15x    Seated Lumbar ROT w/ WB Hold   15x  Yellow MB 15x  Yellow MB 15x  Yellow MB    OH Diagonals w/ WB Hold   15x  Red MB 15x  Red MB 15x  Red MB    Cone Taps (FWD/LAT) 5x FWD  2 Cones HOLD       Side Steps w/ Ball Toss         SLS w/ DB Toss                  Ther Ex         Recumbent Bike (AROM/Strength) NuStep  6 min L2 NuStep  6 min L2 NuStep  6 min L3 NuStep   6 min L3 NuStep   6 min L3 NuStep  6 min L1   Standing Hip 3-Way 15x each 15x each       Standing Marches  "20x  2 lbs 20x   2 lbs    2x10   Standing HS Curls 20x  2 lbs 2x10  2 lbs    2x10   Standing Heel Raises 30x 20x    20x   TKE w/ Ball at Wall   (3\" Hold) 15x 15x       LAQ 3x10 3x10 3x10 3x10  1.5 lbs 3x10  1.5 lbs 3x10   Seated Hip IR w/ Hip ADD Isometric   2x15 2x15 2x15    Supine SLR 2x5  3x5 2x10 2x10 2x5 each   Supine Hip ADD Isometric (3\" Hold) 15x 15x 20x 20x 20x 15x   Supine Clamshells 20x  Red 20x  Green 3x10  Green 3x10  Green 3x10  Green 20x  Red   Supine Multifidus Set (3\" Hold)   15x 15x 15x    Heel Slides (5\" Hold) 15x 15x       Side-Lying Hip ABD   15x 2x10 2x10             Ther Activity          Step-Ups (FWD/LAT) HOLD HOLD    10x FWD  4\" Step   STS w/ Hi-Lo Table         Side Steps 6x10ft 6x10ft       Squats at Parallel Bars         Sled Push/Pull         DB Lift from Floor to Table                  Gait Training         Stair Negotiation         Crate Carry w/ Ambulation         Gray Negotiation (FWD/LAT)         Farmer's Carry         Resisted FWD/LAT Ambulation                  Modalities                                              "

## 2025-05-16 ENCOUNTER — OFFICE VISIT (OUTPATIENT)
Dept: PHYSICAL THERAPY | Facility: CLINIC | Age: 65
End: 2025-05-16
Attending: FAMILY MEDICINE
Payer: MEDICARE

## 2025-05-16 DIAGNOSIS — M54.50 CHRONIC BILATERAL LOW BACK PAIN, UNSPECIFIED WHETHER SCIATICA PRESENT: ICD-10-CM

## 2025-05-16 DIAGNOSIS — G89.29 CHRONIC PAIN OF LEFT KNEE: ICD-10-CM

## 2025-05-16 DIAGNOSIS — G89.29 CHRONIC BILATERAL LOW BACK PAIN, UNSPECIFIED WHETHER SCIATICA PRESENT: ICD-10-CM

## 2025-05-16 DIAGNOSIS — M25.561 CHRONIC PAIN OF RIGHT KNEE: Primary | ICD-10-CM

## 2025-05-16 DIAGNOSIS — G89.29 CHRONIC PAIN OF RIGHT KNEE: Primary | ICD-10-CM

## 2025-05-16 DIAGNOSIS — M25.562 CHRONIC PAIN OF LEFT KNEE: ICD-10-CM

## 2025-05-16 PROCEDURE — 97110 THERAPEUTIC EXERCISES: CPT

## 2025-05-16 PROCEDURE — 97140 MANUAL THERAPY 1/> REGIONS: CPT

## 2025-05-16 NOTE — PROGRESS NOTES
Daily Note     Today's date: 2025  Patient name: Carmen Jones  : 1960  MRN: [id number]  Referring provider: Wayne Sampson*  Dx:   Encounter Diagnosis     ICD-10-CM    1. Chronic pain of right knee  M25.561     G89.29       2. Chronic pain of left knee  M25.562     G89.29       3. Chronic bilateral low back pain, unspecified whether sciatica present  M54.50     G89.29           Start Time: 0900  Stop Time: 0945  Total time in clinic (min): 45 minutes    Subjective: Pt reports pain in BL knees.       Objective: See treatment diary below      Assessment: Pt tolerated treatment fairly. Pt was able to perform exercises as per POC noted below. Mild decrease in soreness LLB post MFR. No change in knee pain throughout nor post tx. Pt demonstrated fatigue post treatment and would benefit from continued PT to improve her lumbar mobility and maximize her tolerance for her usual activities.       Plan: Continue per plan of care.      Precautions: Hx of anxiety, T2DM, GERD, and HTN  HEP Code: [email or web address]   POC: 2025-2025      FOTO 03/10 03/28 04/29      Visit Number 1 6 14      Current Score 65 59 67      Goal Score 66 66 66          Manuals    STM of Quadriceps/Hip Flexor/ITB/Hamstrings         B/L Patellar Mobilizations         Tibiofemoral Distraction of B/L Knees 4 min        STM/MFR of Lumbar/Sacral Paraspinals 9 min 12 min 12 min 12 min 12 min 12 min             Neuro Re-Ed         Seated Marches w/ ALT Ball Tap   15x 15x 15x 15x   Seated Lumbar ROT w/ WB Hold   15x  Yellow MB 15x  Yellow MB 15x  Yellow MB 15x  Yellow MB   OH Diagonals w/ WB Hold   15x  Red MB 15x  Red MB 15x  Red MB 15x  Red MB   Cone Taps (FWD/LAT) 5x FWD  2 Cones HOLD       Side Steps w/ Ball Toss         SLS w/ DB Toss                  Ther Ex         Recumbent Bike (AROM/Strength) NuStep  6 min L2 NuStep  6 min L2 NuStep  6 min L3 NuStep   6 min L3 NuStep   6 min L3 NuStep  6 min L3  "  Standing Hip 3-Way 15x each 15x each       Standing Marches 20x  2 lbs 20x   2 lbs       Standing HS Curls 20x  2 lbs 2x10  2 lbs       Standing Heel Raises 30x 20x       TKE w/ Ball at Wall   (3\" Hold) 15x 15x       LAQ 3x10 3x10 3x10 3x10  1.5 lbs 3x10  1.5 lbs 3x10  1.5 lbs    Seated Hip IR w/ Hip ADD Isometric   2x15 2x15 2x15 2x15    Supine SLR 2x5  3x5 2x10 2x10 2x5 each   Supine Hip ADD Isometric (3\" Hold) 15x 15x 20x 20x 20x 15x   Supine Clamshells 20x  Red 20x  Green 3x10  Green 3x10  Green 3x10  Green 20x  Green    Supine Multifidus Set (3\" Hold)   15x 15x 15x 15x   Heel Slides (5\" Hold) 15x 15x       Side-Lying Hip ABD   15x 2x10 2x10 2x10            Ther Activity          Step-Ups (FWD/LAT) HOLD HOLD       STS w/ Hi-Lo Table         Side Steps 6x10ft 6x10ft       Squats at Parallel Bars         Sled Push/Pull         DB Lift from Floor to Table                  Gait Training         Stair Negotiation         Crate Carry w/ Ambulation         Gray Negotiation (FWD/LAT)         Farmer's Carry         Resisted FWD/LAT Ambulation                  Modalities                                              "

## 2025-05-20 ENCOUNTER — OFFICE VISIT (OUTPATIENT)
Dept: PHYSICAL THERAPY | Facility: CLINIC | Age: 65
End: 2025-05-20
Attending: FAMILY MEDICINE
Payer: MEDICARE

## 2025-05-20 DIAGNOSIS — G89.29 CHRONIC BILATERAL LOW BACK PAIN, UNSPECIFIED WHETHER SCIATICA PRESENT: ICD-10-CM

## 2025-05-20 DIAGNOSIS — G89.29 CHRONIC PAIN OF LEFT KNEE: ICD-10-CM

## 2025-05-20 DIAGNOSIS — G89.29 CHRONIC PAIN OF RIGHT KNEE: Primary | ICD-10-CM

## 2025-05-20 DIAGNOSIS — M54.50 CHRONIC BILATERAL LOW BACK PAIN, UNSPECIFIED WHETHER SCIATICA PRESENT: ICD-10-CM

## 2025-05-20 DIAGNOSIS — M25.562 CHRONIC PAIN OF LEFT KNEE: ICD-10-CM

## 2025-05-20 DIAGNOSIS — M25.561 CHRONIC PAIN OF RIGHT KNEE: Primary | ICD-10-CM

## 2025-05-20 PROCEDURE — 97112 NEUROMUSCULAR REEDUCATION: CPT

## 2025-05-20 PROCEDURE — 97140 MANUAL THERAPY 1/> REGIONS: CPT

## 2025-05-20 PROCEDURE — 97110 THERAPEUTIC EXERCISES: CPT

## 2025-05-20 NOTE — PROGRESS NOTES
"Daily Note     Today's date: 2025  Patient name: Carmen Jones  : 1960  MRN: [id number]  Referring provider: Wayne Sampson*  Dx:   Encounter Diagnosis     ICD-10-CM    1. Chronic pain of right knee  M25.561     G89.29       2. Chronic pain of left knee  M25.562     G89.29       3. Chronic bilateral low back pain, unspecified whether sciatica present  M54.50     G89.29           Start Time: 902  Stop Time: 955  Total time in clinic (min): 53 minutes    Subjective: Waleska reported that her back still has its \"tweaks\" every once in a while, and she is scheduled to have injections into her knees on 2025.      Objective: See treatment diary below      Assessment: Waleska tolerated treatment well. Her program is well tolerated, and she required minimal verbal cueing for correction of posture. The exercises remained in seated and supine positions secondary to her B/L knee pain. She continues to demonstrate a good response to manual therapy, and her overall tenderness in the lumbar spine and sacrum decreased. Waleska exhibited good technique with therapeutic exercises, and she will likely be discharging to an [facility] following next visit.       Plan: Continue per plan of care.  Potential discharge next visit.     Precautions: Hx of anxiety, T2DM, GERD, and HTN  HEP Code: [email or web address]   POC: 2025-2025      FOTO 03/10 03/28 04/29      Visit Number 1 6 14      Current Score 65 59 67      Goal Score 66 66 66          Manuals    STM of Quadriceps/Hip Flexor/ITB/Hamstrings         B/L Patellar Mobilizations         Tibiofemoral Distraction of B/L Knees         STM/MFR of Lumbar/Sacral Paraspinals 15 min  12 min 12 min 12 min 12 min             Neuro Re-Ed         Seated Marches w/ ALT Ball Tap 15x  15x 15x 15x 15x   Seated Lumbar ROT w/ WB Hold 15x  Yellow MB  15x  Yellow MB 15x  Yellow MB 15x  Yellow MB 15x  Yellow MB   OH Diagonals w/ WB Hold 15x  Red MB  15x  Red MB " "15x  Red MB 15x  Red MB 15x  Red MB   Supine TA Hold w/ March 15x each        Cone Taps (FWD/LAT)         Side Steps w/ Ball Toss         SLS w/ DB Toss                  Ther Ex         Recumbent Bike (AROM/Strength) NuStep  6 min L3  NuStep  6 min L3 NuStep   6 min L3 NuStep   6 min L3 NuStep  6 min L3   Standing Hip 3-Way         Standing Marches         Standing HS Curls         Standing Heel Raises         TKE w/ Ball at Wall   (3\" Hold)         LAQ 3x10  2 lbs  3x10 3x10  1.5 lbs 3x10  1.5 lbs 3x10  1.5 lbs    Seated Hip IR w/ Hip ADD Isometric 2x15  2x15 2x15 2x15 2x15    Supine SLR 2x10  3x5 2x10 2x10 2x5 each   Supine Hip ADD Isometric (3\" Hold) 20x  20x 20x 20x 15x   Supine Clamshells 3x10  Green  3x10  Green 3x10  Green 3x10  Green 20x  Green    Supine Multifidus Set (3\" Hold) 20x  15x 15x 15x 15x   Side-Lying Hip ABD 2x10  15x 2x10 2x10 2x10            Ther Activity          Step-Ups (FWD/LAT)         STS w/ Hi-Lo Table         Side Steps         Squats at Parallel Bars         Sled Push/Pull         DB Lift from Floor to Table                  Gait Training         Stair Negotiation         Crate Carry w/ Ambulation         Gray Negotiation (FWD/LAT)         Farmer's Carry         Resisted FWD/LAT Ambulation                  Modalities                                                "

## 2025-05-23 ENCOUNTER — EVALUATION (OUTPATIENT)
Dept: PHYSICAL THERAPY | Facility: CLINIC | Age: 65
End: 2025-05-23
Attending: FAMILY MEDICINE
Payer: MEDICARE

## 2025-05-23 DIAGNOSIS — G89.29 CHRONIC PAIN OF LEFT KNEE: ICD-10-CM

## 2025-05-23 DIAGNOSIS — M25.561 CHRONIC PAIN OF RIGHT KNEE: Primary | ICD-10-CM

## 2025-05-23 DIAGNOSIS — M54.50 CHRONIC BILATERAL LOW BACK PAIN, UNSPECIFIED WHETHER SCIATICA PRESENT: ICD-10-CM

## 2025-05-23 DIAGNOSIS — G89.29 CHRONIC PAIN OF RIGHT KNEE: Primary | ICD-10-CM

## 2025-05-23 DIAGNOSIS — G89.29 CHRONIC BILATERAL LOW BACK PAIN, UNSPECIFIED WHETHER SCIATICA PRESENT: ICD-10-CM

## 2025-05-23 DIAGNOSIS — M25.562 CHRONIC PAIN OF LEFT KNEE: ICD-10-CM

## 2025-05-23 PROCEDURE — 97140 MANUAL THERAPY 1/> REGIONS: CPT

## 2025-05-23 PROCEDURE — 97110 THERAPEUTIC EXERCISES: CPT

## 2025-05-23 NOTE — LETTER
"May 23, 2025    Wayne Sampson MD  [address] [place] [id number]    Patient: Carmen Jones   YOB: 1960   Date of Visit: 2025     Encounter Diagnosis     ICD-10-CM    1. Chronic pain of right knee  M25.561     G89.29       2. Chronic pain of left knee  M25.562     G89.29       3. Chronic bilateral low back pain, unspecified whether sciatica present  M54.50     G89.29           Dear Dr. Wayne Sampson MD:    Thank you for your recent referral of Carmen Jones. Please review the attached evaluation summary from Carmen's recent visit.     Please verify that you agree with the plan of care by signing the attached order.     If you have any questions or concerns, please do not hesitate to call.     I sincerely appreciate the opportunity to share in the care of one of your patients and hope to have another opportunity to work with you in the near future.       Sincerely,    Romy Torrez, PT      Referring Provider:      I certify that I have read the below Plan of Care and certify the need for these services furnished under this plan of treatment while under my care.                    aWyne Sampson MD  [address] [place] [id number]  Via Fax: [phone]          PT Re-Evaluation     Today's date: 2025  Patient name: Carmen Jones  : 1960  MRN: [id number]  Referring provider: Wayne Sampson*  Dx:   Encounter Diagnosis     ICD-10-CM    1. Chronic pain of right knee  M25.561     G89.29       2. Chronic pain of left knee  M25.562     G89.29       3. Chronic bilateral low back pain, unspecified whether sciatica present  M54.50     G89.29             Start Time: 0900  Stop Time: 0954  Total time in clinic (min): 54 minutes    Assessment  Symptom irritability: moderate    Assessment details: Carmen \"Waleska\" Robert is a 65-year-old female who presented to outpatient physical therapy services for bilateral chronic knee pain. She attended 19 visits, " and she demonstrated good progress toward her functional goals. She exhibited improvement in her B/L knee FLX AROM, and her patellar mobility increased overall in all directions. Overall TTP in the B/L knees decreased, and she stated that her symptoms and function have improved by 60%. Her lumbar AROM increased in all directions, and she reported no TTP at the B/L PSIS. She exhibited a (-) FADIR and Crossed SLR in the LLE, indicating reduced irritability of the lumbar spine. Waleska reported that her low back is at its PLOF, and she would like to transition to an IHEP to manage her chronic knee and back symptoms. She was discharged from skilled physical therapy services, and she was provided with a comprehensive IHEP to assist in maintaining the B/L knee and lumbar AROM, strength, activity tolerance, posture, and endurance obtained through therapy. Time was allotted for questions and concerns, and these were answered to Waleska's satisfaction. She was advised to contact her referring provider or PCP with regard to any changes in status.   Understanding of Dx/Px/POC: good     Prognosis: good    Goals  Short-Term Goals (1-4 Weeks)  1. Waleska will improve her B/L hip FLX AROM by 10 degrees. (Met)  2. Waleska will increase her B/L knee FLX MMT by 1 grade. (Met)  3. Waleska will have minimal TTP on the medial joint line of the L knee. (Not Met)  4. Waleska will report an at worst subjective pain rating of 6/10. (Met)  5. Waleska will improve her lumbar FLX AROM by 10 degrees. (Met)  6. Waleska will have minimal TTP at the B/L PSIS. (Met)    Long-Term Goals (5-8 Weeks)  1. Waleska will demonstrate B/L patellar mobility that is WFL in all directions to indicate reduced quadriceps tightness and greater mobility of the joints. (Met)  2. Waleska will demonstrate B/L knee MMT that is WFL to improve her ability to safely negotiate stairs. (Met)  3. Waleska will improve her B/L knee symptoms and function by at least 75% to show greater tolerance for  "household and recreational activities. (Partially Met-60%)  4. Waleska will engage in recreational play with her grandchildren with minimal pain in her knees to improve her quality of life. (Partially Met)  5. Waleska will have minimal to no symptoms in her lumbar spine to show a return to her PLOF. (Met)  6. Waleska will achieve a FOTO score of 66 prior to discharge. (Met)    Plan  Patient would benefit from: home program    Planned therapy interventions: home exercise program    Treatment plan discussed with: patient  Plan details: Waleska reviewed denice agreed with the POC.        Subjective Evaluation    History of Present Illness  Mechanism of injury: Waleska reported that her knees are still aching, but she is scheduled to receive injections during the first week of June. Overall, she feels like the strength in her knees has improved by at least 60%. Her tolerance for activity is gradually improving, but her tolerance for participation in recreation is limited secondary to her knee pain. The B/L knee symptoms continue to be bothered with static standing and prolonged ambulation. Waleska remains reliant on use of the handrail when necessary when negotiating stairs. Her tolerance for walking and standing remains at 30 minutes. Waleska is active with her grandchildren, but she is unable to perform any light running (i.e. chasing a ball) with them secondary to her knees. Her back is feeling much better, and she only experiences a \"tweak\" every once in a while. She has returned to her PLOF with regard to her usual activities without her back impeding her tolerance. Waleska stated that she does not require Aleve any more for her symptoms. At this time, Waleska would like to transition to an [facility] to manage her chronic knee and back pain.   Quality of life: good    Patient Goals  Patient goals for therapy: decreased pain, improved balance, increased motion, independence with ADLs/IADLs, return to sport/leisure activities and increased " strength    Pain  Current pain ratin (0 (back))  At best pain ratin (0 (back))  At worst pain ratin (3 (back))  Location: B/L Anterior Knee (Right Lumbar)  Quality: dull ache (dull ache)  Relieving factors: medications and rest (Aleve)  Aggravating factors: stair climbing, walking and lifting (standing, walking (back))  Progression: improved (improved)    Social Support    Employment status: not working  Treatments  Previous treatment: physical therapy and injection treatment  Current treatment: injection treatment, medication and physical therapy        Objective     Palpation   Left   No palpable tenderness to the rectus femoris.   Tenderness of the adductor brevis and adductor longus.     Right   No palpable tenderness to the rectus femoris.   Tenderness of the adductor brevis and adductor longus.     Tenderness     Left Hip   No tenderness in the ASIS, PSIS and greater trochanter.     Right Hip   Tenderness in the ASIS and greater trochanter. No tenderness in the PSIS.   Left Knee   Tenderness in the medial joint line and pes anserinus. No tenderness in the ITB, lateral joint line, patellar tendon, quadriceps tendon and tibial tubercle.     Right Knee   Tenderness in the ITB and medial joint line. No tenderness in the lateral joint line, patellar tendon and tibial tubercle.     Active Range of Motion     Lumbar   Flexion: 75 degrees   Extension: 18 degrees   Left lateral flexion: 25 degrees     Right lateral flexion: 30 degrees   Left rotation:  Restriction level: minimal  Right rotation:  Restriction level: minimal  Left Hip   Flexion: 92 degrees   External rotation (90/90): 33 degrees   Internal rotation (90/90): 29 degrees     Right Hip   Flexion: 87 degrees   External rotation (90/90): 40 degrees   Internal rotation (90/90): 30 degrees   Left Knee   Flexion: 110 degrees   Extension: 0 degrees     Right Knee   Flexion: 115 degrees   Extension: 0 degrees     Mobility   Patellar Mobility:   Left  Knee   WFL: medial and lateral.   Hypomobile: left superior and left inferior    Right Knee   WFL: medial and lateral  Hypomobile: superior and inferior   Tibiofemoral Mobility:   Left knee   Tibiofemoral tendons within functional limits include the anteriorHypomobile in the posterior tibiofemoral tendon(s).   Right knee Tibiofemoral tendons within functional limits include the anterior. Hypomobile in the posterior tibiofemoral tendon(s).     Patellar Static Positioning   Left Knee: WFL  Right Knee: WFL    Strength/Myotome Testing     Left Hip   Planes of Motion   Flexion: 4  External rotation: 4+  Internal rotation: 4+    Right Hip   Planes of Motion   Flexion: 4+  External rotation: 4+  Internal rotation: 4+    Left Knee   Flexion: 5  Extension: 4+  Quadriceps contraction: good    Right Knee   Flexion: 4+  Extension: 4+  Quadriceps contraction: good    Left Ankle/Foot   Dorsiflexion: 5  Plantar flexion: 5  Inversion: 4+  Eversion: 5    Right Ankle/Foot   Dorsiflexion: 4+  Plantar flexion: 5  Inversion: 5  Eversion: 5    Tests     Lumbar     Left   Negative crossed SLR and passive SLR.     Right   Negative crossed SLR and passive SLR.     Left Pelvic Girdle/Sacrum   Negative: active SLR test.     Right Pelvic Girdle/Sacrum   Negative: active SLR test.     Left Hip   Negative YULIYA, FADIR and long sit.     Right Hip   Negative YULIYA, FADIR and long sit.     Ambulation     Quality of Movement During Gait   Trunk  Trunk within functional limits.   Trunk (Left): Positive left lateral lean over stance limb.     Pelvis  Anterior pelvic tilt.     Knee    Knee (Left): Positive quad avoidance and valgus thrust.   Knee (Right): Positive quad avoidance.     Ankle    Ankle (Left): Positive supinated.   Ankle (Right): Positive supinated.     Foot Alignment    Foot Alignment (Left): Positive planovalgus.   Foot Alignment (Right): Positive planovalgus.     Additional Quality of Movement During Gait Details  Decreased L hip EXT  "and decreased R hip FLX               Precautions: Hx of anxiety, T2DM, GERD, and HTN  HEP Code: [id number]       FOTO 03/10 03/28 04/29      Visit Number 1 6 14      Current Score 65 59 67      Goal Score 66 66 66          Manuals 05/20 05/23 05/08 5/13 5/16   STM of Quadriceps/Hip Flexor/ITB/Hamstrings         B/L Patellar Mobilizations         Tibiofemoral Distraction of B/L Knees         STM/MFR of Lumbar/Sacral Paraspinals 15 min   12 min 12 min 12 min    Review of Lumbar Spine and B/L Knees  20 min                Neuro Re-Ed         Seated Marches w/ ALT Ball Tap 15x   15x 15x 15x   Seated Lumbar ROT w/ WB Hold 15x  Yellow MB   15x  Yellow MB 15x  Yellow MB 15x  Yellow MB   OH Diagonals w/ WB Hold 15x  Red MB   15x  Red MB 15x  Red MB 15x  Red MB   Supine TA Hold w/ March 15x each        Cone Taps (FWD/LAT)         Side Steps w/ Ball Toss         SLS w/ DB Toss                  Ther Ex         Review of HEP/POC/Pt Education  8 min       Recumbent Bike (AROM/Strength) NuStep  6 min L3 NuStep  6 min L3  NuStep   6 min L3 NuStep   6 min L3 NuStep  6 min L3   Standing Hip 3-Way  20x each       Standing Marches  15x each       Standing HS Curls  20x each       Standing Heel Raises  20x       TKE w/ Ball at Wall   (3\" Hold)         LAQ 3x10  2 lbs 2x10  2 lbs  3x10  1.5 lbs 3x10  1.5 lbs 3x10  1.5 lbs    Seated Hip IR w/ Hip ADD Isometric 2x15 2x15  2x15 2x15 2x15    Supine SLR 2x10 2x10  2x10 2x10 2x5 each   Supine Hip ADD Isometric (3\" Hold) 20x 20x  20x 20x 15x   Supine Clamshells 3x10  Green 3x10  Green  3x10  Green 3x10  Green 20x  Green    Supine Multifidus Set (3\" Hold) 20x 15x  15x 15x 15x   Side-Lying Hip ABD 2x10 2x10  2x10 2x10 2x10            Ther Activity          Step-Ups (FWD/LAT)         STS w/ Hi-Lo Table         Side Steps         Squats at Parallel Bars         Sled Push/Pull         DB Lift from Floor to Table                  Gait Training         Stair Negotiation         Crate Carry w/ " Ambulation         Gray Negotiation (FWD/LAT)         Farmer's Carry         Resisted FWD/LAT Ambulation                  Modalities

## 2025-05-23 NOTE — PROGRESS NOTES
"PT Re-Evaluation     Today's date: 2025  Patient name: Carmen Jones  : 1960  MRN: [id number]  Referring provider: Wayne Sampson*  Dx:   Encounter Diagnosis     ICD-10-CM    1. Chronic pain of right knee  M25.561     G89.29       2. Chronic pain of left knee  M25.562     G89.29       3. Chronic bilateral low back pain, unspecified whether sciatica present  M54.50     G89.29             Start Time: 0900  Stop Time: 0954  Total time in clinic (min): 54 minutes    Assessment  Symptom irritability: moderate    Assessment details: Carmen \"Waleska\" Robert is a 65-year-old female who presented to outpatient physical therapy services for bilateral chronic knee pain. She attended 19 visits, and she demonstrated good progress toward her functional goals. She exhibited improvement in her B/L knee FLX AROM, and her patellar mobility increased overall in all directions. Overall TTP in the B/L knees decreased, and she stated that her symptoms and function have improved by 60%. Her lumbar AROM increased in all directions, and she reported no TTP at the B/L PSIS. She exhibited a (-) FADIR and Crossed SLR in the LLE, indicating reduced irritability of the lumbar spine. Waleska reported that her low back is at its PLOF, and she would like to transition to an IHEP to manage her chronic knee and back symptoms. She was discharged from skilled physical therapy services, and she was provided with a comprehensive IHEP to assist in maintaining the B/L knee and lumbar AROM, strength, activity tolerance, posture, and endurance obtained through therapy. Time was allotted for questions and concerns, and these were answered to Waleska's satisfaction. She was advised to contact her referring provider or PCP with regard to any changes in status.   Understanding of Dx/Px/POC: good     Prognosis: good    Goals  Short-Term Goals (1-4 Weeks)  1. Waleska will improve her B/L hip FLX AROM by 10 degrees. (Met)  2. Waleska will increase her B/L " knee FLX MMT by 1 grade. (Met)  3. Waleska will have minimal TTP on the medial joint line of the L knee. (Not Met)  4. Waleska will report an at worst subjective pain rating of 6/10. (Met)  5. Waleska will improve her lumbar FLX AROM by 10 degrees. (Met)  6. Waleska will have minimal TTP at the B/L PSIS. (Met)    Long-Term Goals (5-8 Weeks)  1. Waleska will demonstrate B/L patellar mobility that is WFL in all directions to indicate reduced quadriceps tightness and greater mobility of the joints. (Met)  2. Waleska will demonstrate B/L knee MMT that is WFL to improve her ability to safely negotiate stairs. (Met)  3. Waleska will improve her B/L knee symptoms and function by at least 75% to show greater tolerance for household and recreational activities. (Partially Met-60%)  4. Waleska will engage in recreational play with her grandchildren with minimal pain in her knees to improve her quality of life. (Partially Met)  5. Waleska will have minimal to no symptoms in her lumbar spine to show a return to her PLOF. (Met)  6. Waleska will achieve a FOTO score of 66 prior to discharge. (Met)    Plan  Patient would benefit from: home program    Planned therapy interventions: home exercise program    Treatment plan discussed with: patient  Plan details: Waleska reviewed denice agreed with the POC.        Subjective Evaluation    History of Present Illness  Mechanism of injury: Waleska reported that her knees are still aching, but she is scheduled to receive injections during the first week of June. Overall, she feels like the strength in her knees has improved by at least 60%. Her tolerance for activity is gradually improving, but her tolerance for participation in recreation is limited secondary to her knee pain. The B/L knee symptoms continue to be bothered with static standing and prolonged ambulation. Waleska remains reliant on use of the handrail when necessary when negotiating stairs. Her tolerance for walking and standing remains at 30 minutes.  "Waleska is active with her grandchildren, but she is unable to perform any light running (i.e. chasing a ball) with them secondary to her knees. Her back is feeling much better, and she only experiences a \"tweak\" every once in a while. She has returned to her PLOF with regard to her usual activities without her back impeding her tolerance. Waleska stated that she does not require Aleve any more for her symptoms. At this time, Waleska would like to transition to an [facility] to manage her chronic knee and back pain.   Quality of life: good    Patient Goals  Patient goals for therapy: decreased pain, improved balance, increased motion, independence with ADLs/IADLs, return to sport/leisure activities and increased strength    Pain  Current pain ratin (0 (back))  At best pain ratin (0 (back))  At worst pain ratin (3 (back))  Location: B/L Anterior Knee (Right Lumbar)  Quality: dull ache (dull ache)  Relieving factors: medications and rest (Aleve)  Aggravating factors: stair climbing, walking and lifting (standing, walking (back))  Progression: improved (improved)    Social Support    Employment status: not working  Treatments  Previous treatment: physical therapy and injection treatment  Current treatment: injection treatment, medication and physical therapy        Objective     Palpation   Left   No palpable tenderness to the rectus femoris.   Tenderness of the adductor brevis and adductor longus.     Right   No palpable tenderness to the rectus femoris.   Tenderness of the adductor brevis and adductor longus.     Tenderness     Left Hip   No tenderness in the ASIS, PSIS and greater trochanter.     Right Hip   Tenderness in the ASIS and greater trochanter. No tenderness in the PSIS.   Left Knee   Tenderness in the medial joint line and pes anserinus. No tenderness in the ITB, lateral joint line, patellar tendon, quadriceps tendon and tibial tubercle.     Right Knee   Tenderness in the ITB and medial joint line. No " tenderness in the lateral joint line, patellar tendon and tibial tubercle.     Active Range of Motion     Lumbar   Flexion: 75 degrees   Extension: 18 degrees   Left lateral flexion: 25 degrees     Right lateral flexion: 30 degrees   Left rotation:  Restriction level: minimal  Right rotation:  Restriction level: minimal  Left Hip   Flexion: 92 degrees   External rotation (90/90): 33 degrees   Internal rotation (90/90): 29 degrees     Right Hip   Flexion: 87 degrees   External rotation (90/90): 40 degrees   Internal rotation (90/90): 30 degrees   Left Knee   Flexion: 110 degrees   Extension: 0 degrees     Right Knee   Flexion: 115 degrees   Extension: 0 degrees     Mobility   Patellar Mobility:   Left Knee   WFL: medial and lateral.   Hypomobile: left superior and left inferior    Right Knee   WFL: medial and lateral  Hypomobile: superior and inferior   Tibiofemoral Mobility:   Left knee   Tibiofemoral tendons within functional limits include the anteriorHypomobile in the posterior tibiofemoral tendon(s).   Right knee Tibiofemoral tendons within functional limits include the anterior. Hypomobile in the posterior tibiofemoral tendon(s).     Patellar Static Positioning   Left Knee: WFL  Right Knee: WFL    Strength/Myotome Testing     Left Hip   Planes of Motion   Flexion: 4  External rotation: 4+  Internal rotation: 4+    Right Hip   Planes of Motion   Flexion: 4+  External rotation: 4+  Internal rotation: 4+    Left Knee   Flexion: 5  Extension: 4+  Quadriceps contraction: good    Right Knee   Flexion: 4+  Extension: 4+  Quadriceps contraction: good    Left Ankle/Foot   Dorsiflexion: 5  Plantar flexion: 5  Inversion: 4+  Eversion: 5    Right Ankle/Foot   Dorsiflexion: 4+  Plantar flexion: 5  Inversion: 5  Eversion: 5    Tests     Lumbar     Left   Negative crossed SLR and passive SLR.     Right   Negative crossed SLR and passive SLR.     Left Pelvic Girdle/Sacrum   Negative: active SLR test.     Right Pelvic  Girdle/Sacrum   Negative: active SLR test.     Left Hip   Negative YULIYA, FADIR and long sit.     Right Hip   Negative YULIYA, FADIR and long sit.     Ambulation     Quality of Movement During Gait   Trunk  Trunk within functional limits.   Trunk (Left): Positive left lateral lean over stance limb.     Pelvis  Anterior pelvic tilt.     Knee    Knee (Left): Positive quad avoidance and valgus thrust.   Knee (Right): Positive quad avoidance.     Ankle    Ankle (Left): Positive supinated.   Ankle (Right): Positive supinated.     Foot Alignment    Foot Alignment (Left): Positive planovalgus.   Foot Alignment (Right): Positive planovalgus.     Additional Quality of Movement During Gait Details  Decreased L hip EXT and decreased R hip FLX               Precautions: Hx of anxiety, T2DM, GERD, and HTN  HEP Code: [id number]       FOTO 03/10 03/28 04/29      Visit Number 1 6 14      Current Score 65 59 67      Goal Score 66 66 66          Manuals 05/20 05/23 05/08 5/13 5/16   STM of Quadriceps/Hip Flexor/ITB/Hamstrings         B/L Patellar Mobilizations         Tibiofemoral Distraction of B/L Knees         STM/MFR of Lumbar/Sacral Paraspinals 15 min   12 min 12 min 12 min    Review of Lumbar Spine and B/L Knees  20 min                Neuro Re-Ed         Seated Marches w/ ALT Ball Tap 15x   15x 15x 15x   Seated Lumbar ROT w/ WB Hold 15x  Yellow MB   15x  Yellow MB 15x  Yellow MB 15x  Yellow MB   OH Diagonals w/ WB Hold 15x  Red MB   15x  Red MB 15x  Red MB 15x  Red MB   Supine TA Hold w/ March 15x each        Cone Taps (FWD/LAT)         Side Steps w/ Ball Toss         SLS w/ DB Toss                  Ther Ex         Review of HEP/POC/Pt Education  8 min       Recumbent Bike (AROM/Strength) NuStep  6 min L3 NuStep  6 min L3  NuStep   6 min L3 NuStep   6 min L3 NuStep  6 min L3   Standing Hip 3-Way  20x each       Standing Marches  15x each       Standing HS Curls  20x each       Standing Heel Raises  20x       TKE w/ Ball at Wall  "  (3\" Hold)         LAQ 3x10  2 lbs 2x10  2 lbs  3x10  1.5 lbs 3x10  1.5 lbs 3x10  1.5 lbs    Seated Hip IR w/ Hip ADD Isometric 2x15 2x15  2x15 2x15 2x15    Supine SLR 2x10 2x10  2x10 2x10 2x5 each   Supine Hip ADD Isometric (3\" Hold) 20x 20x  20x 20x 15x   Supine Clamshells 3x10  Green 3x10  Green  3x10  Green 3x10  Green 20x  Green    Supine Multifidus Set (3\" Hold) 20x 15x  15x 15x 15x   Side-Lying Hip ABD 2x10 2x10  2x10 2x10 2x10            Ther Activity          Step-Ups (FWD/LAT)         STS w/ Hi-Lo Table         Side Steps         Squats at Parallel Bars         Sled Push/Pull         DB Lift from Floor to Table                  Gait Training         Stair Negotiation         Crate Carry w/ Ambulation         Gray Negotiation (FWD/LAT)         Farmer's Carry         Resisted FWD/LAT Ambulation                  Modalities                                      "

## 2025-05-27 ENCOUNTER — APPOINTMENT (OUTPATIENT)
Dept: PHYSICAL THERAPY | Facility: CLINIC | Age: 65
End: 2025-05-27
Attending: FAMILY MEDICINE
Payer: MEDICARE

## 2025-05-29 ENCOUNTER — APPOINTMENT (OUTPATIENT)
Dept: PHYSICAL THERAPY | Facility: CLINIC | Age: 65
End: 2025-05-29
Attending: FAMILY MEDICINE
Payer: MEDICARE